# Patient Record
Sex: FEMALE | Race: WHITE | NOT HISPANIC OR LATINO | Employment: UNEMPLOYED | URBAN - METROPOLITAN AREA
[De-identification: names, ages, dates, MRNs, and addresses within clinical notes are randomized per-mention and may not be internally consistent; named-entity substitution may affect disease eponyms.]

---

## 2017-05-17 ENCOUNTER — CONVERSION ENCOUNTER (OUTPATIENT)
Dept: GENERAL RADIOLOGY | Facility: HOSPITAL | Age: 57
End: 2017-05-17

## 2019-05-14 ENCOUNTER — HOSPITAL ENCOUNTER (OUTPATIENT)
Dept: MAMMOGRAPHY | Facility: HOSPITAL | Age: 59
Discharge: HOME | End: 2019-05-14
Attending: OBSTETRICS & GYNECOLOGY

## 2019-05-17 ENCOUNTER — HOSPITAL ENCOUNTER (OUTPATIENT)
Dept: MAMMOGRAPHY | Facility: HOSPITAL | Age: 59
Discharge: HOME | End: 2019-05-17
Attending: OBSTETRICS & GYNECOLOGY

## 2019-05-28 ENCOUNTER — HOSPITAL ENCOUNTER (OUTPATIENT)
Dept: ULTRASOUND IMAGING | Facility: HOSPITAL | Age: 59
Discharge: HOME | End: 2019-05-28
Attending: OBSTETRICS & GYNECOLOGY

## 2019-06-19 ENCOUNTER — CONVERSION ENCOUNTER (OUTPATIENT)
Dept: SURGERY | Facility: CLINIC | Age: 59
End: 2019-06-19

## 2019-06-19 ENCOUNTER — OFFICE VISIT CONVERTED (OUTPATIENT)
Dept: SURGERY | Facility: CLINIC | Age: 59
End: 2019-06-19
Attending: SURGERY

## 2019-07-09 ENCOUNTER — HOSPITAL ENCOUNTER (OUTPATIENT)
Dept: PERIOP | Facility: HOSPITAL | Age: 59
Setting detail: HOSPITAL OUTPATIENT SURGERY
Discharge: HOME | End: 2019-07-09
Attending: SURGERY

## 2019-07-09 LAB
ANION GAP SERPL CALC-SCNC: 13 MMOL/L (ref 8–19)
BUN SERPL-MCNC: 15 MG/DL (ref 5–25)
BUN/CREAT SERPL: 19 {RATIO} (ref 6–20)
CALCIUM SERPL-MCNC: 9 MG/DL (ref 8.7–10.4)
CHLORIDE SERPL-SCNC: 105 MMOL/L (ref 99–111)
CONV CO2: 29 MMOL/L (ref 22–32)
CREAT UR-MCNC: 0.8 MG/DL (ref 0.5–0.9)
GFR SERPLBLD BASED ON 1.73 SQ M-ARVRAT: >60 ML/MIN/{1.73_M2}
GLUCOSE SERPL-MCNC: 97 MG/DL (ref 65–99)
OSMOLALITY SERPL CALC.SUM OF ELEC: 295 MOSM/KG (ref 273–304)
POTASSIUM SERPL-SCNC: 4.5 MMOL/L (ref 3.5–5.3)
SODIUM SERPL-SCNC: 142 MMOL/L (ref 135–147)

## 2019-07-17 ENCOUNTER — OFFICE VISIT CONVERTED (OUTPATIENT)
Dept: SURGERY | Facility: CLINIC | Age: 59
End: 2019-07-17
Attending: SURGERY

## 2019-07-24 ENCOUNTER — OFFICE VISIT CONVERTED (OUTPATIENT)
Dept: SURGERY | Facility: CLINIC | Age: 59
End: 2019-07-24
Attending: SURGERY

## 2019-08-09 ENCOUNTER — HOSPITAL ENCOUNTER (OUTPATIENT)
Dept: PERIOP | Facility: HOSPITAL | Age: 59
Setting detail: HOSPITAL OUTPATIENT SURGERY
Discharge: HOME | End: 2019-08-09
Attending: SURGERY

## 2019-08-09 LAB — TROPONIN T SERPL-MCNC: <0.01 NG/ML (ref 0–0.1)

## 2019-08-15 ENCOUNTER — OFFICE VISIT CONVERTED (OUTPATIENT)
Dept: SURGERY | Facility: CLINIC | Age: 59
End: 2019-08-15
Attending: SURGERY

## 2019-08-22 ENCOUNTER — OFFICE VISIT CONVERTED (OUTPATIENT)
Dept: SURGERY | Facility: CLINIC | Age: 59
End: 2019-08-22
Attending: SURGERY

## 2019-09-03 ENCOUNTER — HOSPITAL ENCOUNTER (OUTPATIENT)
Dept: RADIATION ONCOLOGY | Facility: HOSPITAL | Age: 59
Setting detail: RECURRING SERIES
Discharge: HOME | End: 2019-09-30
Attending: RADIOLOGY

## 2019-09-05 ENCOUNTER — OFFICE VISIT CONVERTED (OUTPATIENT)
Dept: SURGERY | Facility: CLINIC | Age: 59
End: 2019-09-05
Attending: SURGERY

## 2019-10-01 ENCOUNTER — HOSPITAL ENCOUNTER (OUTPATIENT)
Dept: RADIATION ONCOLOGY | Facility: HOSPITAL | Age: 59
Setting detail: RECURRING SERIES
Discharge: HOME | End: 2019-10-31
Attending: RADIOLOGY

## 2019-10-28 ENCOUNTER — CONVERSION ENCOUNTER (OUTPATIENT)
Dept: SURGERY | Facility: CLINIC | Age: 59
End: 2019-10-28

## 2019-10-28 ENCOUNTER — OFFICE VISIT CONVERTED (OUTPATIENT)
Dept: SURGERY | Facility: CLINIC | Age: 59
End: 2019-10-28
Attending: NURSE PRACTITIONER

## 2019-10-30 ENCOUNTER — OFFICE VISIT CONVERTED (OUTPATIENT)
Dept: SURGERY | Facility: CLINIC | Age: 59
End: 2019-10-30
Attending: SURGERY

## 2019-11-01 ENCOUNTER — HOSPITAL ENCOUNTER (OUTPATIENT)
Dept: ULTRASOUND IMAGING | Facility: HOSPITAL | Age: 59
Discharge: HOME | End: 2019-11-01
Attending: SURGERY

## 2019-11-08 ENCOUNTER — HOSPITAL ENCOUNTER (OUTPATIENT)
Dept: RADIATION ONCOLOGY | Facility: HOSPITAL | Age: 59
Discharge: HOME | End: 2019-11-08
Attending: RADIOLOGY

## 2019-11-11 ENCOUNTER — OFFICE VISIT CONVERTED (OUTPATIENT)
Dept: SURGERY | Facility: CLINIC | Age: 59
End: 2019-11-11
Attending: SURGERY

## 2019-11-22 ENCOUNTER — HOSPITAL ENCOUNTER (OUTPATIENT)
Dept: RADIATION ONCOLOGY | Facility: HOSPITAL | Age: 59
Discharge: HOME | End: 2019-11-22
Attending: RADIOLOGY

## 2019-12-06 ENCOUNTER — OFFICE VISIT CONVERTED (OUTPATIENT)
Dept: ONCOLOGY | Facility: HOSPITAL | Age: 59
End: 2019-12-06
Attending: INTERNAL MEDICINE

## 2019-12-06 ENCOUNTER — HOSPITAL ENCOUNTER (OUTPATIENT)
Dept: ONCOLOGY | Facility: HOSPITAL | Age: 59
Discharge: HOME | End: 2019-12-06
Attending: INTERNAL MEDICINE

## 2019-12-23 ENCOUNTER — OFFICE VISIT CONVERTED (OUTPATIENT)
Dept: SURGERY | Facility: CLINIC | Age: 59
End: 2019-12-23
Attending: SURGERY

## 2020-01-27 ENCOUNTER — HOSPITAL ENCOUNTER (OUTPATIENT)
Dept: MAMMOGRAPHY | Facility: HOSPITAL | Age: 60
Discharge: HOME | End: 2020-01-27
Attending: INTERNAL MEDICINE

## 2020-02-11 ENCOUNTER — OFFICE VISIT CONVERTED (OUTPATIENT)
Dept: ONCOLOGY | Facility: HOSPITAL | Age: 60
End: 2020-02-11
Attending: INTERNAL MEDICINE

## 2020-02-11 ENCOUNTER — HOSPITAL ENCOUNTER (OUTPATIENT)
Dept: ONCOLOGY | Facility: HOSPITAL | Age: 60
Discharge: HOME | End: 2020-02-11
Attending: INTERNAL MEDICINE

## 2020-02-28 ENCOUNTER — HOSPITAL ENCOUNTER (OUTPATIENT)
Dept: RADIATION ONCOLOGY | Facility: HOSPITAL | Age: 60
Discharge: HOME | End: 2020-02-28
Attending: RADIOLOGY

## 2020-06-05 ENCOUNTER — HOSPITAL ENCOUNTER (OUTPATIENT)
Dept: RADIATION ONCOLOGY | Facility: HOSPITAL | Age: 60
Discharge: HOME | End: 2020-06-05
Attending: RADIOLOGY

## 2020-06-05 ENCOUNTER — HOSPITAL ENCOUNTER (OUTPATIENT)
Dept: URGENT CARE | Facility: CLINIC | Age: 60
Discharge: HOME | End: 2020-06-05
Attending: NURSE PRACTITIONER

## 2020-06-08 LAB — B BURGDOR IGG+IGM SER-ACNC: <0.91 ISR (ref 0–0.9)

## 2020-06-09 LAB
E CHAFFEENSIS IGG TITR SER IF: NEGATIVE {TITER}
E. CHAFFEENSIS (HME) IGM TITER: NEGATIVE
R RICKETTSI IGG SER QL IA: NEGATIVE
R RICKETTSI IGM TITR SER: 0.4 INDEX (ref 0–0.89)

## 2020-07-07 ENCOUNTER — HOSPITAL ENCOUNTER (OUTPATIENT)
Dept: PHYSICAL THERAPY | Facility: CLINIC | Age: 60
Setting detail: RECURRING SERIES
Discharge: STILL ADMITTED OR ADMINISTRATIVE | End: 2020-10-28
Attending: SURGERY

## 2020-07-23 ENCOUNTER — HOSPITAL ENCOUNTER (OUTPATIENT)
Dept: MAMMOGRAPHY | Facility: HOSPITAL | Age: 60
Discharge: HOME | End: 2020-07-23
Attending: INTERNAL MEDICINE

## 2020-08-14 ENCOUNTER — OFFICE VISIT CONVERTED (OUTPATIENT)
Dept: ONCOLOGY | Facility: HOSPITAL | Age: 60
End: 2020-08-14
Attending: INTERNAL MEDICINE

## 2020-08-14 ENCOUNTER — HOSPITAL ENCOUNTER (OUTPATIENT)
Dept: ONCOLOGY | Facility: HOSPITAL | Age: 60
Discharge: HOME | End: 2020-08-14
Attending: INTERNAL MEDICINE

## 2020-10-30 ENCOUNTER — HOSPITAL ENCOUNTER (OUTPATIENT)
Dept: PHYSICAL THERAPY | Facility: CLINIC | Age: 60
Setting detail: RECURRING SERIES
Discharge: HOME | End: 2021-02-18
Attending: SURGERY

## 2021-05-15 VITALS — RESPIRATION RATE: 14 BRPM | HEIGHT: 63 IN | BODY MASS INDEX: 46.95 KG/M2 | WEIGHT: 265 LBS

## 2021-05-15 VITALS — WEIGHT: 265 LBS | BODY MASS INDEX: 46.95 KG/M2 | RESPIRATION RATE: 14 BRPM | HEIGHT: 63 IN

## 2021-05-15 VITALS — BODY MASS INDEX: 46.95 KG/M2 | RESPIRATION RATE: 14 BRPM | HEIGHT: 63 IN | WEIGHT: 265 LBS

## 2021-05-15 VITALS — BODY MASS INDEX: 46.95 KG/M2 | WEIGHT: 265 LBS | HEIGHT: 63 IN | RESPIRATION RATE: 14 BRPM

## 2021-05-15 VITALS — BODY MASS INDEX: 49.43 KG/M2 | WEIGHT: 279 LBS | HEIGHT: 63 IN | RESPIRATION RATE: 14 BRPM

## 2021-05-15 VITALS — BODY MASS INDEX: 46.95 KG/M2 | WEIGHT: 265 LBS | RESPIRATION RATE: 14 BRPM | HEIGHT: 63 IN

## 2021-05-15 VITALS — WEIGHT: 279 LBS | HEIGHT: 63 IN | BODY MASS INDEX: 49.43 KG/M2 | HEART RATE: 96 BPM

## 2021-05-15 VITALS — WEIGHT: 265 LBS | HEIGHT: 63 IN | BODY MASS INDEX: 46.95 KG/M2 | RESPIRATION RATE: 14 BRPM

## 2021-05-15 VITALS — BODY MASS INDEX: 48.73 KG/M2 | WEIGHT: 275 LBS | HEIGHT: 63 IN | RESPIRATION RATE: 20 BRPM

## 2021-05-15 VITALS — RESPIRATION RATE: 18 BRPM | HEIGHT: 63 IN | WEIGHT: 275 LBS | BODY MASS INDEX: 48.73 KG/M2

## 2021-05-22 ENCOUNTER — TRANSCRIBE ORDERS (OUTPATIENT)
Dept: ADMINISTRATIVE | Facility: HOSPITAL | Age: 61
End: 2021-05-22

## 2021-05-22 DIAGNOSIS — Z12.31 VISIT FOR SCREENING MAMMOGRAM: Primary | ICD-10-CM

## 2021-05-22 DIAGNOSIS — Z12.39 SCREENING BREAST EXAMINATION: ICD-10-CM

## 2021-05-28 VITALS
HEART RATE: 96 BPM | OXYGEN SATURATION: 98 % | RESPIRATION RATE: 20 BRPM | SYSTOLIC BLOOD PRESSURE: 145 MMHG | HEIGHT: 63 IN | WEIGHT: 276.02 LBS | TEMPERATURE: 98 F | BODY MASS INDEX: 47.89 KG/M2 | BODY MASS INDEX: 51.88 KG/M2 | BODY MASS INDEX: 48.89 KG/M2 | WEIGHT: 270.28 LBS | TEMPERATURE: 98.5 F | OXYGEN SATURATION: 98 % | HEART RATE: 99 BPM | SYSTOLIC BLOOD PRESSURE: 120 MMHG | DIASTOLIC BLOOD PRESSURE: 84 MMHG | DIASTOLIC BLOOD PRESSURE: 75 MMHG | WEIGHT: 292.77 LBS | OXYGEN SATURATION: 100 % | TEMPERATURE: 97.8 F | HEIGHT: 63 IN | HEART RATE: 78 BPM | RESPIRATION RATE: 20 BRPM | SYSTOLIC BLOOD PRESSURE: 182 MMHG | DIASTOLIC BLOOD PRESSURE: 85 MMHG

## 2021-05-28 NOTE — PROGRESS NOTES
Patient: ALYSSA PUTNAM     Acct: [id number]     Report: #[id number]  UNIT #: [id number]     : 1960    Encounter Date:2020  PRIMARY CARE: NIELS LATHAM  ***Signed***  --------------------------------------------------------------------------------------------------------------------  NURSE INTAKE      Visit Type      Established Patient Visit            Chief Complaint      BREAST CA POST RADIATION HERE FOR FOLLOW UP            History and Present Illness      Past Oncology Illness History      Ms. Putnam is a 59-year-old female with early stage breast cancer who comes in for    treatment recommendations following lumpectomy and radiation.  She initially     underwent a screening mammogram which led to discovery of this mass on     2019.  There is an area of architectural distortion in the upper outer left    breast.  Diagnostic mammogram and ultrasound performed on 2019 revealed a     spiculated abnormality in the upper outer left breast corresponding to a     geographic area of abnormal tissue density which appears to be spiculated and     ultrasound at the 1:30 position 8 cm from the nipple measuring 8 x 6 mm.      Pathology from the biopsy performed on 2019 revealed a radial scar negative    for atypia and malignancy.  Left breast lumpectomy was performed on 2019     revealing pure tubular carcinoma, 9 mm in greatest dimension, grade 1 with     associated DCIS and LCIS, ER positive (95%, strong), progesterone receptor     positive (90%, moderate), Ki-67 approximately 10%, HER-2 negative by     immunohistochemistry (score 1+). [facility] lymph node biopsy was performed on     2019.  2 lymph nodes were removed and pathology was negative on both.  Ms. LAURY christie completed radiation in late 2019.  Patient was referred to medical     oncology after completion of radiation therapy. Patient started on anastrozole     in 2019.            HPI - Oncology Interim       Patient came in today to follow-up after starting anastrozole.  She is     tolerating the medication well and has no side effects.  Her latest mammogram     was on 7/23/2020 and was negative.            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            ECOG Performance Status      0            PAST, FAMILY   Past Medical History      Past Medical History:  Arthritis, High Cholesterol, Hypertension      Hematology/Oncology (F):  Breast Cancer            Past Surgical History      Biopsy, Cholecystectomy            DNC            Family History      Family History:  Leukopenia (FATHER), Lung Cancer (MOTHER)            Social History      Marital Status:        Lives independently:  Yes      Number of Children:  3            Tobacco Use      Tobacco status:  Former smoker            Alcohol Use      Alcohol intake:  None            Substance Use      Substance use:  Denies use            REVIEW OF SYSTEMS      General:  Denies: Appetite Change, Fatigue, Fever, Night Sweats, Weight Gain,     Weight Loss      Eye:  Denies Blurred Vision, Denies Corrective Lenses, Denies Diplopia, Denies     Vision Changes      ENT:  Denies Headache, Denies Hearing Loss, Denies Hoarseness, Denies Sore     Throat      Cardiovascular:  Denies Chest Pain, Denies Palpitations      Respiratory:  Denies: Cough, Coughing Blood, Productive Cough, Shortness of Air,    Wheezing      Gastrointestinal:  Denies Bloody Stools, Denies Constipation, Denies Diarrhea,     Denies Nausea/Vomiting, Denies Problem Swallowing, Denies Unable to Control Pablo    wels      Genitourinary:  Denies Blood in Urine, Denies Incontinence, Denies Painful     Urination      Musculoskeletal:  Admits Back Pain; Denies Muscle Pain, Denies Painful Joints      Integumentary:  Denies Itching, Denies Lesions, Denies Rash      Neurologic:  Denies Dizziness; Admits Numbness\Tingling; Denies Seizures      Psychiatric:  Denies Anxiety, Denies  Depression      Endocrine:  Denies Cold Intolerance, Denies Heat Intolerance      Hematologic/Lymphatic:  Denies Bruising, Denies Bleeding, Denies Enlarged Lymph     Nodes      Reproductive:  Denies: Menopause, Heavy Periods, Pregnant, Still Menstruating            VITAL SIGNS AND SCORES      Vitals      Height 5 ft 3.00 in / 160.02 cm      Weight 292 lbs 12.334 oz / 132.8 kg      BSA 2.27 m2      BMI 51.9 kg/m2      Temperature 97.8 F / 36.56 C - Temporal      Pulse 78      Respirations 20      Blood Pressure 120/84 Sitting, Right Arm      Pulse Oximetry 100%, ROOM AIR            Pain Score      Experiencing any pain?:  No      Pain Scale Used:  Numerical      Pain Intensity:  0            Fatigue Score      Experiencing any fatigue?:  No      Fatigue (0-10 scale):  0 (none)            EXAM      General: Alert, cooperative, no acute distress      Eyes: Anicteric sclera, PERRLA      HEENT: Oropharynx clear, no exudates      Respiratory: CTAB, normal respiratory effort      Abdomen: Normal active bowel sounds, no tenderness, no distention      Cardiovascular: RRR, no murmur, no peripheral edema      Skin: Normal tone, no rash, no lesions      Psychiatric: Appropriate affect, intact judgment      Neurologic: No focal sensory or motor deficits, no weakness, numbness, dizziness      Musculoskeletal: Normal muscle strength, normal muscle tone      Extremities: No clubbing, cyanosis, or deformities            PREVENTION      Hx Influenza Vaccination:  Yes      Date Influenza Vaccine Given:  Dec 1, 2019      Influenza Vaccine Declined:  No      2 or More Falls in Past Year?:  No      Fall Past Year with Injury?:  No      Hx Pneumococcal Vaccination:  Yes      Encouraged to follow-up with:  PCP regarding preventative exams.      Chart initiated by      ASHU GODWIN            ALLERGY/MEDS      Allergies      Coded Allergies:             MUSHROOM (Verified  Allergy, Unknown, BREAK OUT, ITCH, 6/5/20)           NO KNOWN  DRUG ALLERGIES (Verified  Allergy, Unknown, 6/5/20)            Medications      Last Reconciled on 8/20/20 20:19 by CURTIS MOROCHO      Anastrozole (Anastrozole) 1 Mg Tablet      1 MG PO QDAY for 30 Days, #30 TAB 2 Refills         Prov: CURTIS MOROCHO         6/25/20       Doxycycline Hyclate (Doxycycline Hyclate*) 100 Mg Capsule      100 MG PO BID for 10 Days, #20 CAP 0 Refills         Prov: TUNG ROSENTHAL cfe         6/5/20       Doxycycline Hyclate (Doxycycline Hyclate*) 100 Mg Capsule      100 MG PO BID for 10 Days, #20 CAP 0 Refills         Prov: CURTIS MOROCHO         2/11/20       Gabapentin (Gabapentin) 100 Mg Capsule      100 MG PO TID for 10 Days, #30 CAP 0 Refills         Prov: BECCAMARIALUISA         8/9/19       Lisinopril* (Lisinopril*) 20 Mg Tablet      20 MG PO HS, #30 TAB 0 Refills         Reported         7/3/19       Cholecalciferol (Vitamin D3) 50,000 Unit Capsule      67968 UNITS PO Q7D, CAP         Reported         7/3/19       (Turmeric/Black Pep.)   No Conflict Check      1 TAB PO QDAY         Reported         7/3/19       Cholecalciferol (Vitamin D3) (Vitamin D3) 5,000 Unit Capsule      5000 UNITS PO QDAY for 30 Days, #30 CAP         Reported         7/3/19       Biotin (Biotin) 5 Mg Tab      5 MG PO QDAY, #30 TAB         Reported         7/3/19       (Cbd Oil)   No Conflict Check      1 TAB PO BID         Reported         7/3/19       hydrOXYzine HCL (Atarax) 25 Mg Tablet      25 MG PO HS, #120 TAB 0 Refills         Reported         7/3/19       (Metabaloplus)   No Conflict Check      1 TAB PO QDAY         Reported         7/3/19       (Lipozene)   No Conflict Check      1500 MG PO TID         Reported         7/3/19       Furosemide* (Lasix*) 20 Mg Tablet      20 MG PO QDAY PRN for EDEMA, #30 TAB 0 Refills         Reported         7/3/19       Butalb/Aspirin/Caff/Codeine 50/325/40/30 MG (Fiorinal w/Codeine) 1 Cap Capsule      1 CAP PO Q4H PRN for HEADACHE, CAP         Reported         7/3/19        Gabapentin (Gabapentin) 800 Mg Tablet      800 MG PO TID, #90 TAB 0 Refills         Reported         7/3/19       (Amitriptyline)   No Conflict Check      150 MG PO HS         Reported         7/3/19       Pravastatin Sodium (Pravastatin Sodium) 40 Mg Tablet      40 MG PO HS, #30 TAB 0 Refills         Reported         7/3/19       Nabumetone (Nabumetone) 750 Mg Tab      750 MG PO BID, #60 TAB         Reported         7/3/19      Medications Reviewed:  No Changes made to meds            IMPRESSION/PLAN      Impression      59-year-old female with a history of 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  She is currently on anastrozole.            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  She completed     radiation in November 2019 and started anastrozole in December.  She is     tolerating AI well and will continue it until Dec 2024.  She will be due for     repeat mammogram in July 2021 which is ordered by her PCP.            DCIS and LCIS: Patient incidentally found to have both premalignant findings on     lumpectomy.  She will continue AI as above.             Osteopenia: DEXA scan shows osteopenia in the hips in January 2020.  She was     encouraged to continue calcium and vitamin D supplementation.  She was also     encouraged to dissipate in weightbearing exercise.  Her next DEXA scan should be     done in January 2021.            Left breast lymphedema: Patient is currently seeing PT for evaluation and     treatment.            Patient Education      Patient Education Provided:  Yes            Electronically signed by CURTIS MOROCHO  08/20/2020 20:20        Disclaimer: Converted document may not contain table formatting or lab diagrams. Please see [employer] for the authenticated document.

## 2021-05-28 NOTE — PROGRESS NOTES
Patient: ALYSSA PUTNAM     Acct: [id number]     Report: #[id number]  UNIT #: [id number]     : 1960    Encounter Date:2019  PRIMARY CARE: NIELS LATHAM  ***Signed***  --------------------------------------------------------------------------------------------------------------------  NURSE INTAKE      Visit Type      New Patient Visit            Chief Complaint      BREAST CA/ POST RADATION            History and Present Illness      Past Oncology Illness History      Ms. Putnam is a 59-year-old female with early stage breast cancer who comes in for    treatment recommendations following lumpectomy and radiation.  She initially     underwent a screening mammogram which led to discovery of this mass on     2019.  There is an area of architectural distortion in the upper outer left    breast.  Diagnostic mammogram and ultrasound performed on 2019 revealed a     spiculated abnormality in the upper outer left breast corresponding to a     geographic area of abnormal tissue density which appears to be spiculated and     ultrasound at the 1:30 position 8 cm from the nipple measuring 8 x 6 mm.      Pathology from the biopsy performed on 2019 revealed a radial scar negative    for atypia and malignancy.  Left breast lumpectomy was performed on 2019     revealing pure tubular carcinoma, 9 mm in greatest dimension, grade 1 with     associated DCIS and LCIS, ER positive (95%, strong), progesterone receptor     positive (90%, moderate), Ki-67 approximately 10%, HER-2 negative by     immunohistochemistry (score 1+). [facility] lymph node biopsy was performed on     2019.  2 lymph nodes were removed and pathology was negative on both.  Ms. Putnam completed radiation in late 2019.  Patient was referred to medical    oncology after completion of radiation therapy.            HPI - Oncology Interim      Patient comes in today for evaluation regarding long-term treatment of her  stage    I tubular adenocarcinoma with associated DCIS and LCIS.  She feels well with the    exception of skin irritation and thickening following radiation therapy.  She     questions why she needed to come see medical oncologist since she thought she     was completely finished with treatment.  We spent a long time discussing the     reasons for an AI and the risks and benefits.  During the discussion the patient    mentioned that she has a history of a vulvar skin condition which caused white     patches and took some time to diagnose.  She was eventually treated with     complete resolution but the symptoms have recurred.  She has not yet sought     treatment again with her gynecologist.            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            ECOG Performance Status      0            PAST, FAMILY   Past Medical History      Past Medical History:  Arthritis, High Cholesterol, Hypertension      Hematology/Oncology (F):  Breast Cancer            Past Surgical History      Biopsy, Cholecystectomy            DNC            Family History      Family History:  Leukopenia (FATHER), Lung Cancer (MOTHER)            Social History      Marital Status:        Lives independently:  Yes      Number of Children:  3            Tobacco Use      Tobacco status:  Former smoker            Alcohol Use      Alcohol intake:  None            Substance Use      Substance use:  Denies use            REVIEW OF SYSTEMS      General:  Admits: Fatigue;          Denies: Appetite Change, Fever, Night Sweats, Weight Gain, Weight Loss      Eye:  Denies Blurred Vision, Denies Corrective Lenses, Denies Diplopia, Denies     Vision Changes      ENT:  Denies Headache, Denies Hearing Loss, Denies Hoarseness, Denies Sore     Throat      Cardiovascular:  Admits Chest Pain; Denies Palpitations      Respiratory:  Denies: Coughing Blood, Productive Cough, Shortness of Air,     Wheezing      Gastrointestinal:   Denies Bloody Stools, Denies Constipation, Denies Diarrhea,     Denies Nausea/Vomiting, Denies Problem Swallowing, Denies Unable to Control     Bowels      Genitourinary:  Denies Blood in Urine, Denies Incontinence, Denies Painful     Urination      Musculoskeletal:  Denies Back Pain, Denies Muscle Pain, Denies Painful Joints      Integumentary:  Denies Itching, Denies Lesions, Denies Rash      Neurologic:  Denies Dizziness, Denies Numbness\Tingling, Denies Seizures      Psychiatric:  Denies Anxiety, Denies Depression      Endocrine:  Denies Cold Intolerance, Denies Heat Intolerance      Hematologic/Lymphatic:  Denies Bruising, Denies Bleeding, Denies Enlarged Lymph     Nodes      Reproductive:  Denies: Menopause, Heavy Periods, Pregnant, Still Menstruating            VITAL SIGNS AND SCORES      Vitals      Height 5 ft 3 in / 160.02 cm      Weight 270 lbs 4.543 oz / 122.6 kg      BSA 2.20 m2      BMI 47.9 kg/m2      Temperature 98.5 F / 36.94 C - Temporal      Pulse 99      Blood Pressure 145/75 Sitting, Right Arm      Pulse Oximetry 98%, RM AIR            Pain Score      Experiencing any pain?:  Yes      Pain Scale Used:  Numerical      Pain Intensity:  8            Fatigue Score      Experiencing any fatigue?:  Yes      Fatigue (0-10 scale):  3            EXAM      General: Alert, cooperative, no acute distress      Eyes: Anicteric sclera, PERRLA      HEENT: Oropharynx clear, no exudates      Respiratory: CTAB, normal respiratory effort      Abdomen: Normal active bowel sounds, no tenderness, no distention      Cardiovascular: RRR, no murmur, no peripheral edema      Skin: Skin across the left breast is erythematous with some thickening     consistent with radiation side effects      Psychiatric: Appropriate affect, intact judgment      Neurologic: No focal sensory or motor deficits, no weakness, numbness, dizziness      Musculoskeletal: Normal muscle strength, normal muscle tone      Extremities: No clubbing,  cyanosis, or deformities            PREVENTION      Hx Influenza Vaccination:  Yes      Date Influenza Vaccine Given:  Dec 1, 2019      Influenza Vaccine Declined:  No      2 or More Falls Past Year?:  No      Fall Past Year with Injury?:  No      Hx Pneumococcal Vaccination:  Yes      Encouraged to follow-up with:  PCP regarding preventative exams.      Chart initiated by      GARY PETTIT CMA            ALLERGY/MEDS      Allergies      Coded Allergies:             MUSHROOM (Verified  Allergy, Unknown, BREAK OUT, ITCH, 12/6/19)           NO KNOWN DRUG ALLERGIES (Verified  Allergy, Unknown, 12/6/19)            Medications      Last Reconciled on 12/8/19 22:19 by CURTIS MOROCHO      Anastrozole (Anastrozole) 1 Mg Tablet      1 MG PO QDAY for 30 Days, #30 TAB 2 Refills         Prov: CURTIS MOROCHO         12/6/19       Gabapentin (Gabapentin) 100 Mg Capsule      100 MG PO TID for 10 Days, #30 CAP 0 Refills         Prov: MARIALUISA HUDSON         8/9/19       Hydrocodone/Acetaminophen 5/325 MG (Hydrocodone/Acetaminophen 5/325 MG) 1 Each     Tablet      2 TAB PO Q4H PRN for PAIN, #30 TAB         Prov: MARCIAL ERICKSON MD         8/9/19       Lisinopril* (Lisinopril*) 20 Mg Tablet      20 MG PO HS, #30 TAB 0 Refills         Reported         7/3/19       Cholecalciferol (Vitamin D3) 50,000 Unit Capsule      70959 UNITS PO Q7D, CAP         Reported         7/3/19       (Turmeric/Black Pep.)   No Conflict Check      1 TAB PO QDAY         Reported         7/3/19       Cholecalciferol (Vitamin D3) 5,000 Unit Capsule      5000 UNITS PO QDAY for 30 Days, #30 CAP         Reported         7/3/19       Biotin (Biotin) 5 Mg Tab      5 MG PO QDAY, #30 TAB         Reported         7/3/19       (Cbd Oil)   No Conflict Check      1 TAB PO BID         Reported         7/3/19       hydrOXYzine HCL (Atarax) 25 Mg Tablet      25 MG PO HS, #120 TAB 0 Refills         Reported         7/3/19       (Metabaloplus)   No Conflict Check      1 TAB  PO QDAY         Reported         7/3/19       (Lipozene)   No Conflict Check      1500 MG PO TID         Reported         7/3/19       Hydrochlorothiazide (Hydrochlorothiazide*) 12.5 Mg Capsule      12.5 MG PO QDAY, #30 CAP 0 Refills         Reported         7/3/19       Furosemide* (Lasix*) 20 Mg Tablet      20 MG PO QDAY PRN for EDEMA, #30 TAB 0 Refills         Reported         7/3/19       Butalb/Aspirin/Caff/Codeine 50/325/40/30 MG (Fiorinal w/Codeine) 1 Cap Capsule      1 CAP PO Q4H PRN for HEADACHE, CAP         Reported         7/3/19       Gabapentin (Gabapentin) 800 Mg Tablet      800 MG PO TID, #90 TAB 0 Refills         Reported         7/3/19       (Amitriptyline)   No Conflict Check      150 MG PO HS         Reported         7/3/19       Pravastatin Sodium (Pravastatin Sodium) 40 Mg Tablet      40 MG PO HS, #30 TAB 0 Refills         Reported         7/3/19       Nabumetone (Nabumetone) 750 Mg Tab      750 MG PO BID, #60 TAB         Reported         7/3/19      Medications Reviewed:  No Changes made to meds            IMPRESSION/PLAN      Impression      59-year-old female with stage I tubular adenocarcinoma with associated DCIS and     LCIS of the left breast status post lumpectomy and radiation.            Diagnosis      Breast cancer - C50.919            High risk medication use - Z79.899            Notes      New Medications      * Anastrozole 1 MG TABLET: 1 MG PO QDAY 30 Days #30      New Diagnostics      * Bone Densitometry DEXA, As Soon As Possible         Dx: Breast cancer - C50.919            Plan      Stage I tubular adenocarcinoma of the left breast: The patient received     lumpectomy and sentinel lymph node biopsy followed by radiation therapy.  For     the small tubular adenocarcinomas there is no recommendation for chemotherapy     and endocrine therapy is optional for any mass less than 3 cm.  However I     discussed long-term treatment with an AI due to the presence of DCIS and LCIS as      well.            DCIS and LCIS: The patient was incidentally found to have both premalignant     findings on lumpectomy.  For this reason she was encouraged to start an     aromatase inhibitor for at least 5 years for breast cancer risk reduction.  We     reviewed the risk and benefit of anastrozole and the patient was agreeable.  She     will be scheduled for DEXA scan and can start anastrozole whenever she picks it     up from the pharmacy.  She will follow-up with me in 6 to 8 weeks for toxicity     check.            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      She remembers using some topical medication and may be an oral medication as     well.  She experienced significant improvement but is now started at the same     symptoms.  I discussed that the patient should return to her gynecologist for     further evaluation and discuss this in the context of starting anastrozole as     well.  She may require more close monitoring and treatment.            Patient Education      Patient Education Provided:  Yes            Time Spent Counseling Patient      Total Visit Time:  75      Over 50% Time Counseling Pt:  Yes            Electronically signed by CURTIS MOROCHO  12/08/2019 22:19       Disclaimer: Converted document may not contain table formatting or lab diagrams. Please see [employer] for the authenticated document.

## 2021-05-28 NOTE — PROGRESS NOTES
Patient: ALYSSA PUTNAM     Acct: [id number]     Report: #[id number]  UNIT #: [id number]     : 1960    Encounter Date:2020  PRIMARY CARE: NIELS LATHAM  ***Signed***  --------------------------------------------------------------------------------------------------------------------  NURSE INTAKE      Visit Type      Established Patient Visit            Chief Complaint      BREAST CANCER/POST RADIATION            History and Present Illness      Past Oncology Illness History      Ms. Putnam is a 59-year-old female with early stage breast cancer who comes in for    treatment recommendations following lumpectomy and radiation.  She initially     underwent a screening mammogram which led to discovery of this mass on     2019.  There is an area of architectural distortion in the upper outer left    breast.  Diagnostic mammogram and ultrasound performed on 2019 revealed a     spiculated abnormality in the upper outer left breast corresponding to a     geographic area of abnormal tissue density which appears to be spiculated and     ultrasound at the 1:30 position 8 cm from the nipple measuring 8 x 6 mm.      Pathology from the biopsy performed on 2019 revealed a radial scar negative    for atypia and malignancy.  Left breast lumpectomy was performed on 2019     revealing pure tubular carcinoma, 9 mm in greatest dimension, grade 1 with     associated DCIS and LCIS, ER positive (95%, strong), progesterone receptor     positive (90%, moderate), Ki-67 approximately 10%, HER-2 negative by     immunohistochemistry (score 1+). [place] lymph node biopsy was performed on     2019.  2 lymph nodes were removed and pathology was negative on both.  Ms. Putnam completed radiation in late 2019.  Patient was referred to medical    oncology after completion of radiation therapy. Patient started on anastrozole     in 2019.            HPI - Oncology Interim      Patient comes  in today for follow-up after starting anastrozole.  She is     tolerating it very well.  She has chronic joint pains from osteoarthritis but     nothing new.  She denies significant fatigue or hot flashes.  Her only complaint    is soreness of her left breast.  She states that her left breast has been sore     and swollen since that time she completed radiation.  At times it is hot to     touch.  She recently fell walking down stairs and landed on that side of her     body.  This episode caused more pain.  She is unable to sleep on her left side     due to the persistent pain.  She is also unable to wear a bra most days.            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            ECOG Performance Status      0            PAST, FAMILY   Past Medical History      Past Medical History:  Arthritis, High Cholesterol, Hypertension      Hematology/Oncology (F):  Breast Cancer            Past Surgical History      Biopsy, Cholecystectomy            DNC            Family History      Family History:  Leukopenia (FATHER), Lung Cancer (MOTHER)            Social History      Marital Status:        Lives independently:  Yes      Number of Children:  3            Tobacco Use      Tobacco status:  Former smoker            Alcohol Use      Alcohol intake:  None            Substance Use      Substance use:  Denies use            REVIEW OF SYSTEMS      General:  Denies: Appetite Change, Fatigue, Fever, Night Sweats, Weight Gain,     Weight Loss      Eye:  Denies Blurred Vision, Denies Corrective Lenses, Denies Diplopia, Denies     Vision Changes      ENT:  Denies Headache, Denies Hearing Loss, Denies Hoarseness, Denies Sore     Throat      Cardiovascular:  Denies Chest Pain, Denies Palpitations      Respiratory:  Denies: Coughing Blood, Productive Cough, Shortness of Air,     Wheezing      Gastrointestinal:  Denies Bloody Stools, Denies Constipation, Denies Diarrhea,     Denies Nausea/Vomiting,  Denies Problem Swallowing, Denies Unable to Control     Bowels      Genitourinary:  Denies Blood in Urine, Denies Incontinence, Denies Painful Uri    nation      Musculoskeletal:  Denies Back Pain, Denies Muscle Pain, Denies Painful Joints      Integumentary:  Denies Itching, Denies Lesions, Denies Rash      Neurologic:  Denies Dizziness, Denies Numbness\Tingling, Denies Seizures      Psychiatric:  Denies Anxiety, Denies Depression      Endocrine:  Denies Cold Intolerance, Denies Heat Intolerance      Hematologic/Lymphatic:  Denies Bruising, Denies Bleeding, Denies Enlarged Lymph     Nodes      Reproductive:  Denies: Menopause, Heavy Periods, Pregnant, Still Menstruating            VITAL SIGNS AND SCORES      Vitals      Weight 276 lbs 0.255 oz / 125.2 kg      Temperature 98.0 F / 36.67 C - Temporal      Pulse 96      Respirations 20      Blood Pressure 182/85 Sitting, Right Arm      Pulse Oximetry 98%, ROOM AIR            Pain Score      Experiencing any pain?:  Yes      Pain Scale Used:  Numerical      Pain Intensity:  8            Fatigue Score      Experiencing any fatigue?:  No      Fatigue (0-10 scale):  0 (none)            EXAM      General: Alert, cooperative, no acute distress      Eyes: Anicteric sclera, PERRLA      HEENT: Oropharynx clear, no exudates      Breast: Left breast is diffusely erythematous and mildly warm.  There is pitting    edema across the entire breast.  Skin changes are not limited to the area of     focused radiation.      Respiratory: CTAB, normal respiratory effort      Abdomen: Normal active bowel sounds, no tenderness, no distention      Cardiovascular: RRR, no murmur, no peripheral edema      Skin: Normal tone, no rash, no lesions      Psychiatric: Appropriate affect, intact judgment      Neurologic: No focal sensory or motor deficits, no weakness, numbness, dizziness      Musculoskeletal: Normal muscle strength, normal muscle tone      Extremities: No clubbing, cyanosis, or  deformities            PREVENTION      Hx Influenza Vaccination:  Yes      Date Influenza Vaccine Given:  Dec 1, 2019      Influenza Vaccine Declined:  No      2 or More Falls Past Year?:  No      Fall Past Year with Injury?:  No      Hx Pneumococcal Vaccination:  Yes      Encouraged to follow-up with:  PCP regarding preventative exams.      Chart initiated by      PAWEL MEDRANO CMA            ALLERGY/MEDS      Allergies      Coded Allergies:             MUSHROOM (Verified  Allergy, Unknown, BREAK OUT, ITCH, 2/11/20)           NO KNOWN DRUG ALLERGIES (Verified  Allergy, Unknown, 2/11/20)            Medications      Last Reconciled on 2/11/20 16:24 by CURTIS MOROCHO      Doxycycline Hyclate (Doxycycline Hyclate*) 100 Mg Capsule      100 MG PO BID for 10 Days, #20 CAP 0 Refills         Prov: CURTIS MOROCHO         2/11/20       Anastrozole (Anastrozole) 1 Mg Tablet      1 MG PO QDAY for 30 Days, #30 TAB 2 Refills         Prov: CURTIS MOROCHO         12/6/19       Gabapentin (Gabapentin) 100 Mg Capsule      100 MG PO TID for 10 Days, #30 CAP 0 Refills         Prov: MARIALUISA HUDSON         8/9/19       Lisinopril* (Lisinopril*) 20 Mg Tablet      20 MG PO HS, #30 TAB 0 Refills         Reported         7/3/19       Cholecalciferol (Vitamin D3) 50,000 Unit Capsule      06949 UNITS PO Q7D, CAP         Reported         7/3/19       (Turmeric/Black Pep.)   No Conflict Check      1 TAB PO QDAY         Reported         7/3/19       Cholecalciferol (Vitamin D3) 5,000 Unit Capsule      5000 UNITS PO QDAY for 30 Days, #30 CAP         Reported         7/3/19       Biotin (Biotin) 5 Mg Tab      5 MG PO QDAY, #30 TAB         Reported         7/3/19       (Cbd Oil)   No Conflict Check      1 TAB PO BID         Reported         7/3/19       hydrOXYzine HCL (Atarax) 25 Mg Tablet      25 MG PO HS, #120 TAB 0 Refills         Reported         7/3/19       (Metabaloplus)   No Conflict Check      1 TAB PO QDAY         Reported         7/3/19        (Lipozene)   No Conflict Check      1500 MG PO TID         Reported         7/3/19       Furosemide* (Lasix*) 20 Mg Tablet      20 MG PO QDAY PRN for EDEMA, #30 TAB 0 Refills         Reported         7/3/19       Butalb/Aspirin/Caff/Codeine 50/325/40/30 MG (Fiorinal w/Codeine) 1 Cap Capsule      1 CAP PO Q4H PRN for HEADACHE, CAP         Reported         7/3/19       Gabapentin (Gabapentin) 800 Mg Tablet      800 MG PO TID, #90 TAB 0 Refills         Reported         7/3/19       (Amitriptyline)   No Conflict Check      150 MG PO HS         Reported         7/3/19       Pravastatin Sodium (Pravastatin Sodium) 40 Mg Tablet      40 MG PO HS, #30 TAB 0 Refills         Reported         7/3/19       Nabumetone (Nabumetone) 750 Mg Tab      750 MG PO BID, #60 TAB         Reported         7/3/19      Medications Reviewed:  Changes made to meds            IMPRESSION/PLAN      Impression      59-year-old female with a history of tubular carcinoma of left breast as well as    LCIS and DCIS.            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  She completed     radiation in November 2019 and was subsequently started on anastrozole.  She is     tolerating AI well.  She will be due for repeat mammogram prior to her next     appointment in August.            DCIS and LCIS: Patient incidentally found to have both premalignant findings on     lumpectomy.  For this reason strongly encouraged her to be on an AI for at  least    5 years for breast cancer risk reduction.  She is tolerating anastrozole well,     which she started in December 2018.            Osteopenia: DEXA scan shows osteopenia in the hips.  She was encouraged to     continue calcium and vitamin D supplementation.  She was also encouraged to     dissipate in weightbearing exercise.  Her next DEXA scan will be 2022.            Left breast pain and edema: The patient's breast exam is not consistent with     typical radiation changes.  My concern is that she may have cellulitis versus     mastitis.  I am prescribing doxycycline 100 mg twice daily for 10 days.  If the     skin complaints do not resolve the patient will call the clinic and return to     see me sooner.  Otherwise, if the breast pain and swelling resolves then she     will keep her follow-up in 6 months.            Patient Education      Patient Education Provided:  Yes            Electronically signed by CURTIS MOROCHO  02/11/2020 16:24       Disclaimer: Converted document may not contain table formatting or lab diagrams. Please see [employer] for the authenticated document.

## 2021-07-29 ENCOUNTER — TELEPHONE (OUTPATIENT)
Dept: ONCOLOGY | Facility: HOSPITAL | Age: 61
End: 2021-07-29

## 2021-07-29 NOTE — TELEPHONE ENCOUNTER
Caller: ABDIAS    Relationship: [employer] [place] [employer]    Best call back number: [phone]    What orders are you requesting (i.e. lab or imaging): MAMMOGRAM (AND ANYTHING ELSE THAT NEEDS TO BE DONE)    In what timeframe would the patient need to come in: PATIENT IS SCHEDULED FOR 08/04    Where will you receive your lab/imaging services: [employer] [place] [facility]    Additional notes: FAX: [phone]

## 2021-08-02 ENCOUNTER — APPOINTMENT (OUTPATIENT)
Dept: MAMMOGRAPHY | Facility: HOSPITAL | Age: 61
End: 2021-08-02

## 2021-08-04 PROBLEM — C50.412 MALIGNANT NEOPLASM OF UPPER-OUTER QUADRANT OF LEFT BREAST IN FEMALE, ESTROGEN RECEPTOR POSITIVE: Status: ACTIVE | Noted: 2019-08-28

## 2021-08-04 PROBLEM — Z17.0 MALIGNANT NEOPLASM OF UPPER-OUTER QUADRANT OF LEFT BREAST IN FEMALE, ESTROGEN RECEPTOR POSITIVE: Status: ACTIVE | Noted: 2019-08-28

## 2021-08-06 PROBLEM — F39 MOOD DISORDER (HCC): Status: ACTIVE | Noted: 2021-08-06

## 2021-08-06 PROBLEM — E78.5 HYPERLIPEMIA: Status: ACTIVE | Noted: 2021-08-06

## 2021-08-06 PROBLEM — N32.9 BLADDER DISORDER: Status: ACTIVE | Noted: 2021-08-06

## 2021-08-06 PROBLEM — R07.9 CHEST PAIN: Status: ACTIVE | Noted: 2021-08-06

## 2021-08-06 PROBLEM — I10 HYPERTENSION: Status: ACTIVE | Noted: 2021-08-06

## 2021-08-06 PROBLEM — M19.90 ARTHRITIS: Status: ACTIVE | Noted: 2021-08-06

## 2021-08-06 RX ORDER — LISINOPRIL 20 MG/1
TABLET ORAL
COMMUNITY

## 2021-08-06 RX ORDER — GABAPENTIN 800 MG/1
TABLET ORAL
COMMUNITY

## 2021-08-06 RX ORDER — HYDROXYZINE 50 MG/1
TABLET, FILM COATED ORAL
COMMUNITY

## 2021-08-06 RX ORDER — AMITRIPTYLINE HYDROCHLORIDE 10 MG/1
TABLET, FILM COATED ORAL
COMMUNITY

## 2021-08-09 ENCOUNTER — APPOINTMENT (OUTPATIENT)
Dept: ONCOLOGY | Facility: HOSPITAL | Age: 61
End: 2021-08-09

## 2021-09-27 ENCOUNTER — TELEPHONE (OUTPATIENT)
Dept: OBSTETRICS AND GYNECOLOGY | Facility: CLINIC | Age: 61
End: 2021-09-27

## 2021-09-27 RX ORDER — METRONIDAZOLE 500 MG/1
500 TABLET ORAL 3 TIMES DAILY
Qty: 21 TABLET | Refills: 0 | OUTPATIENT
Start: 2021-09-27 | End: 2021-10-04

## 2021-12-21 ENCOUNTER — TELEPHONE (OUTPATIENT)
Dept: ONCOLOGY | Facility: HOSPITAL | Age: 61
End: 2021-12-21

## 2021-12-21 NOTE — TELEPHONE ENCOUNTER
Caller: Lily Putnam    Relationship to patient: Self    Best call back number:[phone]    Chief complaint: ELIZABETH. APPT.    Type of visit: FOLLOW UP     If rescheduling, when is the original appointment: 8/9/2021    Additional notes: PATIENT INQUIRING WHEN SHE NEEDS HER NEXT APPT.  NEEDS TO BE.  PATIENT HAS HER MAMMOGRAM ON 8/5/022 & HER BONE DENSITY WAS DONE 11/22/2021.

## 2022-01-31 RX ORDER — ANASTROZOLE 1 MG/1
TABLET ORAL
Qty: 90 TABLET | Refills: 1 | Status: SHIPPED | OUTPATIENT
Start: 2022-01-31 | End: 2022-07-11

## 2022-07-09 DIAGNOSIS — Z12.31 ENCOUNTER FOR SCREENING MAMMOGRAM FOR MALIGNANT NEOPLASM OF BREAST: Primary | ICD-10-CM

## 2022-07-11 RX ORDER — ANASTROZOLE 1 MG/1
TABLET ORAL
Qty: 90 TABLET | Refills: 1 | Status: SHIPPED | OUTPATIENT
Start: 2022-07-11 | End: 2022-07-26 | Stop reason: SDUPTHER

## 2022-07-26 RX ORDER — ANASTROZOLE 1 MG/1
1 TABLET ORAL DAILY
Qty: 90 TABLET | Refills: 1 | Status: SHIPPED | OUTPATIENT
Start: 2022-07-26 | End: 2022-08-23

## 2022-08-12 ENCOUNTER — APPOINTMENT (OUTPATIENT)
Dept: ONCOLOGY | Facility: HOSPITAL | Age: 62
End: 2022-08-12

## 2022-08-16 ENCOUNTER — APPOINTMENT (OUTPATIENT)
Dept: ONCOLOGY | Facility: HOSPITAL | Age: 62
End: 2022-08-16

## 2022-08-23 ENCOUNTER — OFFICE VISIT (OUTPATIENT)
Dept: ONCOLOGY | Facility: HOSPITAL | Age: 62
End: 2022-08-23

## 2022-08-23 VITALS
WEIGHT: 265 LBS | RESPIRATION RATE: 18 BRPM | BODY MASS INDEX: 46.94 KG/M2 | DIASTOLIC BLOOD PRESSURE: 70 MMHG | HEART RATE: 76 BPM | TEMPERATURE: 98.9 F | OXYGEN SATURATION: 96 % | SYSTOLIC BLOOD PRESSURE: 136 MMHG

## 2022-08-23 DIAGNOSIS — C50.412 MALIGNANT NEOPLASM OF UPPER-OUTER QUADRANT OF LEFT BREAST IN FEMALE, ESTROGEN RECEPTOR POSITIVE: Primary | ICD-10-CM

## 2022-08-23 DIAGNOSIS — Z17.0 MALIGNANT NEOPLASM OF UPPER-OUTER QUADRANT OF LEFT BREAST IN FEMALE, ESTROGEN RECEPTOR POSITIVE: Primary | ICD-10-CM

## 2022-08-23 PROCEDURE — 99214 OFFICE O/P EST MOD 30 MIN: CPT | Performed by: NURSE PRACTITIONER

## 2022-08-23 PROCEDURE — G0463 HOSPITAL OUTPT CLINIC VISIT: HCPCS | Performed by: NURSE PRACTITIONER

## 2022-08-23 RX ORDER — ANASTROZOLE 1 MG/1
1 TABLET ORAL DAILY
Qty: 90 TABLET | Refills: 3 | Status: SHIPPED | OUTPATIENT
Start: 2022-08-23

## 2022-08-23 NOTE — PROGRESS NOTES
Chief Complaint  Breast Cancer    Provider, No Known  Sowders, JODEE Unger Cari Putnam presents to [facility] for follow up breast cancer.     History of Present Illness    Ms. Lily Putnam presents for follow up for breast cancer. She follows with Dr. Castillo. Status post lumpectomy and radiation of  the left breast in July of 2019. She is tolerating the Anastrozole well and offers no side effects. Mammogram she had done on 8/8/2022 at the [facility] in [place] is benign. She reports she had bone density recently with [facility] screening and was normal, but did not bring up a copy of the reports. She reports she has a fallen uterus and may be having surgery in the future for this.       Cancer Staging  No matching staging information was found for the patient.     Treatment intent: curative    Oncology/Hematology History Overview Note   Ms. Putnam is a 59-year-old female with early stage breast cancer who comes in for treatment recommendations following lumpectomy and radiation.  She initially underwent a screening mammogram which led to discovery of this mass on 5/14/2019.  There is an area of architectural distortion in the upper outer left breast.  Diagnostic mammogram and ultrasound performed on 5/17/2019 revealed a spiculated abnormality in the upper outer left breast corresponding to a geographic area of abnormal tissue density which appears to be spiculated and ultrasound at the 1:30 position 8 cm from the nipple measuring 8 x 6 mm.  Pathology from the biopsy performed on 8/28/2019 revealed a radial scar negative for atypia and malignancy.  Left breast lumpectomy was performed on 7/19/2019 revealing pure tubular carcinoma, 9 mm in greatest dimension, grade 1 with associated DCIS and LCIS, ER positive (95%, strong), progesterone receptor positive (90%, moderate), Ki-67 approximately 10%, HER-2 negative by immunohistochemistry  (score 1+). [employer] lymph node biopsy was performed on 8/9/2019.  2 lymph nodes were removed and pathology was negative on both.  Ms. Putnam completed radiation in late November 2019.  Patient was referred to medical oncology after completion of radiation therapy. Patient started on anastrozole in December 2019.     Malignant neoplasm of upper-outer quadrant of left breast in female, estrogen receptor positive (HCC)   8/28/2019 Initial Diagnosis    Malignant neoplasm of upper-outer quadrant of left breast in female, estrogen receptor positive (CMS/HCC)         Review of Systems   Constitutional: Positive for fatigue.   Genitourinary: Positive for frequency, pelvic pain, pelvic pressure and urinary incontinence.   Musculoskeletal: Positive for neck pain.   All other systems reviewed and are negative.      Current Outpatient Medications on File Prior to Visit   Medication Sig Dispense Refill   • amitriptyline (ELAVIL) 10 MG tablet amitriptyline 10 mg oral tablet take 1 tablet (10 mg) by oral route once daily at bedtime   Active     • gabapentin (NEURONTIN) 800 MG tablet gabapentin 800 mg oral tablet take 1 tablet (800 mg) by oral route 3 times per day   Active     • hydrOXYzine (ATARAX) 50 MG tablet hydroxyzine HCl 50 mg oral tablet 1 q hs   Active     • lisinopril (PRINIVIL,ZESTRIL) 20 MG tablet lisinopril 20 mg oral tablet take 1 tablet (20 mg) by oral route once daily   Active     • [DISCONTINUED] anastrozole (ARIMIDEX) 1 MG tablet Take 1 tablet by mouth Daily. 90 tablet 1     No current facility-administered medications on file prior to visit.       No Known Allergies  Past Medical History:   Diagnosis Date   • Allergic rhinitis     CHRONIC   • Arthritis    • Bladder disorder    • Chest pain    • Effusion of left knee 09/06/2017   • High blood pressure    • High cholesterol    • Hyperlipemia    • Hypertension    • Left knee pain 09/06/2017    UNSPECIFIED CHRONICITY   • Limb pain    • Limb swelling    • Mood  disorder (HCC)    • Primary osteoarthritis of left knee 2017     Past Surgical History:   Procedure Laterality Date   • BREAST LUMPECTOMY     • CHOLECYSTECTOMY     • COLONOSCOPY  2017   • DILATION AND CURETTAGE, DIAGNOSTIC / THERAPEUTIC     • ENDOSCOPY  2017   • GALLBLADDER SURGERY     • HERNIA REPAIR     • TUBAL ABDOMINAL LIGATION       Social History     Socioeconomic History   • Marital status:    Tobacco Use   • Smoking status: Former Smoker     Start date:      Quit date:      Years since quittin.6   Substance and Sexual Activity   • Alcohol use: Yes     Comment: DRINKS RARELY   • Drug use: Never     Family History   Problem Relation Age of Onset   • Cancer Mother    • Arthritis Mother    • Osteoporosis Mother    • Cancer Father    • Diabetes Father    • Breast cancer Maternal Grandmother    • Breast cancer Other         AUNT 40'S     Immunization History   Administered Date(s) Administered   • COVID-19 (MODERNA) 1st, 2nd, 3rd Dose Only 2021, 2021   • COVID-19 (MODERNA) BOOSTER 2021   • Flublok 18+yrs 10/11/2019, 10/19/2020   • Hepatitis A 10/11/2019, 2020, 2021   • Shingrix 10/16/2019, 2020   • Td 2004   • Tdap 2017, 10/11/2019       Objective   Physical Exam  Vitals and nursing note reviewed.   Constitutional:       Appearance: Normal appearance. She is obese.   HENT:      Nose: Nose normal.      Mouth/Throat:      Mouth: Mucous membranes are moist.   Eyes:      Pupils: Pupils are equal, round, and reactive to light.   Cardiovascular:      Rate and Rhythm: Normal rate and regular rhythm.      Pulses: Normal pulses.      Heart sounds: Normal heart sounds.   Pulmonary:      Effort: Pulmonary effort is normal.      Breath sounds: Normal breath sounds.   Abdominal:      Palpations: Abdomen is soft.   Musculoskeletal:         General: Normal range of motion.      Cervical back: Normal range of motion and neck supple.   Skin:      General: Skin is warm and dry.      Capillary Refill: Capillary refill takes less than 2 seconds.   Neurological:      General: No focal deficit present.      Mental Status: She is oriented to person, place, and time.   Psychiatric:         Mood and Affect: Mood normal.         Behavior: Behavior normal.         Thought Content: Thought content normal.         Judgment: Judgment normal.         Vitals:    08/23/22 1333   BP: 136/70   Pulse: 76   Resp: 18   Temp: 98.9 °F (37.2 °C)   SpO2: 96%   Weight: 120 kg (265 lb)   PainSc:   5   PainLoc: Neck     ECOG score: 0         ECOG: (0) Fully Active - Able to Carry On All Pre-disease Performance Without Restriction  Fall Risk Assessment was completed, and patient is at low risk for falls.  PHQ-9 Total Score: 0       The patient is  experiencing fatigue. Fatigue score: 5    PT/OT Functional Screening: PT fx screen: Pain  Speech Functional Screening: Speech fx screen: No needs identified  Rehab to be ordered: Rehab to be ordered: No needs identified        Result Review :   The following data was reviewed by: JODEE Zhang on 08/23/2022:  No results found for: HGB, HCT, MCV, PLT, WBC, NEUTROABS, LYMPHSABS, MONOSABS, EOSABS, BASOSABS  Lab Results   Component Value Date    GLUCOSE 97 07/09/2019    BUN 15 07/09/2019    CREATININE 0.80 07/09/2019     07/09/2019    K 4.5 07/09/2019     07/09/2019    CO2 29 07/09/2019    CALCIUM 9.0 07/09/2019          Assessment and Plan    Diagnoses and all orders for this visit:    1. Malignant neoplasm of upper-outer quadrant of left breast in female, estrogen receptor positive (HCC) (Primary)  -     anastrozole (ARIMIDEX) 1 MG tablet; Take 1 tablet by mouth Daily.  Dispense: 90 tablet; Refill: 3      Discussed mammogram results which were benign from her mammogram screening on 8/8/2022. Refills sent for Anastrozole to her pharmacy. Reports she discussed with Dr. Castillo previously about following up yearly instead  of every 6 months.     Will see if we can get a copy of the [facility] screening bone density from her PCP office.     She will call if she is having any problems.         Patient Follow Up: 1 year after mammogram.     Patient was given instructions and counseling regarding her condition or for health maintenance advice. Please see specific information pulled into the AVS if appropriate.     Melanie Mendoza, APRN    8/23/2022

## 2023-08-10 DIAGNOSIS — Z12.31 ENCOUNTER FOR SCREENING MAMMOGRAM FOR MALIGNANT NEOPLASM OF BREAST: Primary | ICD-10-CM

## 2023-08-15 ENCOUNTER — OFFICE VISIT (OUTPATIENT)
Dept: ONCOLOGY | Facility: HOSPITAL | Age: 63
End: 2023-08-15
Payer: MEDICAID

## 2023-08-15 VITALS
HEART RATE: 98 BPM | WEIGHT: 228.62 LBS | RESPIRATION RATE: 18 BRPM | BODY MASS INDEX: 40.5 KG/M2 | TEMPERATURE: 97.5 F | OXYGEN SATURATION: 100 %

## 2023-08-15 DIAGNOSIS — C50.412 MALIGNANT NEOPLASM OF UPPER-OUTER QUADRANT OF LEFT BREAST IN FEMALE, ESTROGEN RECEPTOR POSITIVE: ICD-10-CM

## 2023-08-15 DIAGNOSIS — Z17.0 MALIGNANT NEOPLASM OF UPPER-OUTER QUADRANT OF LEFT BREAST IN FEMALE, ESTROGEN RECEPTOR POSITIVE: ICD-10-CM

## 2023-08-15 DIAGNOSIS — Z13.820 SCREENING FOR OSTEOPOROSIS: Primary | ICD-10-CM

## 2023-08-15 PROCEDURE — G0463 HOSPITAL OUTPT CLINIC VISIT: HCPCS | Performed by: NURSE PRACTITIONER

## 2023-08-15 RX ORDER — DOXYCYCLINE HYCLATE 100 MG/1
1 CAPSULE ORAL EVERY 12 HOURS SCHEDULED
COMMUNITY
Start: 2023-07-24

## 2023-08-15 RX ORDER — SEMAGLUTIDE 1.34 MG/ML
1 INJECTION, SOLUTION SUBCUTANEOUS
COMMUNITY

## 2023-08-15 RX ORDER — CYCLOBENZAPRINE HCL 10 MG
10 TABLET ORAL NIGHTLY PRN
COMMUNITY
Start: 2023-07-01

## 2023-08-15 RX ORDER — ALBUTEROL SULFATE 90 UG/1
AEROSOL, METERED RESPIRATORY (INHALATION) SEE ADMIN INSTRUCTIONS
COMMUNITY
Start: 2023-07-28

## 2023-08-15 RX ORDER — ANASTROZOLE 1 MG/1
1 TABLET ORAL DAILY
Qty: 90 TABLET | Refills: 3 | Status: SHIPPED | OUTPATIENT
Start: 2023-08-15

## 2023-08-15 RX ORDER — HYDROCHLOROTHIAZIDE 12.5 MG/1
12.5 CAPSULE, GELATIN COATED ORAL EVERY MORNING
COMMUNITY

## 2023-08-15 RX ORDER — EZETIMIBE 10 MG/1
10 TABLET ORAL EVERY 24 HOURS
COMMUNITY

## 2023-08-15 RX ORDER — MELOXICAM 7.5 MG/1
7.5 TABLET ORAL 2 TIMES DAILY
COMMUNITY

## 2023-08-15 RX ORDER — MIRABEGRON 25 MG/1
25 TABLET, FILM COATED, EXTENDED RELEASE ORAL DAILY
COMMUNITY

## 2023-08-15 RX ORDER — FUROSEMIDE 20 MG/1
20 TABLET ORAL EVERY 24 HOURS
COMMUNITY

## 2023-08-15 NOTE — PROGRESS NOTES
Chief Complaint/Reason for Referral:  Malignant neoplasm of upper-outer quadrant of left breast i    Alexandria Karyn Fernandes*  Karyn Ibrahim Dena, APRN        Subjective    History of Present Illness    Ms. Lily Putnam presents for 1 year follow up for left breast DCIS, LCIS hormone positive. She is on year 4 of 5 endocrine therapy. Tolerating Anastrozole well. Reports she recently had bilateral mammogram done at the [facility] in [place]. She has a chronic left upper outer seroma present. This was seen on prior mammogram from 2022. She has chronic pain here at this location since her surgery.     She is overdue for a bone density. Reports she usually gets this done thru [facility] through her PCP office. She will discuss with her PCP to get this done.       Cancer Staging   No matching staging information was found for the patient.     Treatment intent: curative    Oncology/Hematology History Overview Note   Ms. Putnam is a 59-year-old female with early stage breast cancer who comes in for treatment recommendations following lumpectomy and radiation.  She initially underwent a screening mammogram which led to discovery of this mass on 5/14/2019.  There is an area of architectural distortion in the upper outer left breast.  Diagnostic mammogram and ultrasound performed on 5/17/2019 revealed a spiculated abnormality in the upper outer left breast corresponding to a geographic area of abnormal tissue density which appears to be spiculated and ultrasound at the 1:30 position 8 cm from the nipple measuring 8 x 6 mm.  Pathology from the biopsy performed on 8/28/2019 revealed a radial scar negative for atypia and malignancy.  Left breast lumpectomy was performed on 7/19/2019 revealing pure tubular carcinoma, 9 mm in greatest dimension, grade 1 with associated DCIS and LCIS, ER positive (95%, strong), progesterone receptor positive (90%, moderate), Ki-67 approximately 10%, HER-2 negative by  immunohistochemistry (score 1+). [place] lymph node biopsy was performed on 8/9/2019.  2 lymph nodes were removed and pathology was negative on both.  Ms. Putnam completed radiation in late November 2019.  Patient was referred to medical oncology after completion of radiation therapy. Patient started on anastrozole in December 2019.     Malignant neoplasm of upper-outer quadrant of left breast in female, estrogen receptor positive   8/28/2019 Initial Diagnosis    Malignant neoplasm of upper-outer quadrant of left breast in female, estrogen receptor positive (CMS/HCC)         Review of Systems   Constitutional:  Negative for appetite change, diaphoresis, fatigue, fever, unexpected weight gain and unexpected weight loss.   HENT:  Negative for hearing loss, mouth sores, sore throat, swollen glands, trouble swallowing and voice change.    Eyes:  Negative for blurred vision.   Respiratory:  Negative for cough, shortness of breath and wheezing.    Cardiovascular:  Negative for chest pain and palpitations.   Gastrointestinal:  Negative for abdominal pain, blood in stool, constipation, diarrhea, nausea and vomiting.   Endocrine: Negative for cold intolerance and heat intolerance.   Genitourinary:  Positive for breast pain (3/10 LT breast). Negative for difficulty urinating, dysuria, frequency, hematuria and urinary incontinence.   Musculoskeletal:  Negative for arthralgias, back pain and myalgias.   Skin:  Negative for rash, skin lesions and wound.   Neurological:  Negative for dizziness, seizures, weakness, numbness and headache.   Hematological:  Does not bruise/bleed easily.   Psychiatric/Behavioral:  Negative for sleep disturbance and depressed mood. The patient is not nervous/anxious.    All other systems reviewed and are negative.    Current Outpatient Medications on File Prior to Visit   Medication Sig Dispense Refill    albuterol sulfate  (90 Base) MCG/ACT inhaler Inhale See Admin Instructions. Inhale 2 puffs  by mouth every 4 to 6 hours as needed      cyclobenzaprine (FLEXERIL) 10 MG tablet Take 1 tablet by mouth At Night As Needed.      doxycycline (VIBRAMYCIN) 100 MG capsule Take 1 capsule by mouth Every 12 (Twelve) Hours.      amitriptyline (ELAVIL) 10 MG tablet amitriptyline 10 mg oral tablet take 1 tablet (10 mg) by oral route once daily at bedtime   Active      ezetimibe (ZETIA) 10 MG tablet Take 1 tablet by mouth Daily.      furosemide (LASIX) 20 MG tablet Take 1 tablet by mouth Daily.      gabapentin (NEURONTIN) 800 MG tablet gabapentin 800 mg oral tablet take 1 tablet (800 mg) by oral route 3 times per day   Active      hydroCHLOROthiazide (MICROZIDE) 12.5 MG capsule Take 1 capsule by mouth Every Morning.      hydrOXYzine (ATARAX) 50 MG tablet hydroxyzine HCl 50 mg oral tablet 1 q hs   Active      lisinopril (PRINIVIL,ZESTRIL) 20 MG tablet lisinopril 20 mg oral tablet take 1 tablet (20 mg) by oral route once daily   Active      meloxicam (MOBIC) 7.5 MG tablet Take 1 tablet by mouth 2 (Two) Times a Day.      Myrbetriq 25 MG tablet sustained-release 24 hour 24 hr tablet Take 1 tablet by mouth Daily.      Ozempic, 1 MG/DOSE, 4 MG/3ML solution pen-injector 1 mg.      [DISCONTINUED] anastrozole (ARIMIDEX) 1 MG tablet Take 1 tablet by mouth Daily. 90 tablet 3     No current facility-administered medications on file prior to visit.       No Known Allergies  Past Medical History:   Diagnosis Date    Allergic rhinitis     CHRONIC    Arthritis     Bladder disorder     Chest pain     Effusion of left knee 09/06/2017    High blood pressure     High cholesterol     Hyperlipemia     Hypertension     Left knee pain 09/06/2017    UNSPECIFIED CHRONICITY    Limb pain     Limb swelling     Mood disorder     Primary osteoarthritis of left knee 09/12/2017     Past Surgical History:   Procedure Laterality Date    BREAST LUMPECTOMY      CHOLECYSTECTOMY  2017    COLONOSCOPY  2017    DILATION AND CURETTAGE, DIAGNOSTIC / THERAPEUTIC   1984    ENDOSCOPY  2017    GALLBLADDER SURGERY      HERNIA REPAIR      TUBAL ABDOMINAL LIGATION       Social History     Socioeconomic History    Marital status:    Tobacco Use    Smoking status: Former     Types: Cigarettes     Start date:      Quit date:      Years since quittin.6   Substance and Sexual Activity    Alcohol use: Yes     Comment: DRINKS RARELY    Drug use: Never     Family History   Problem Relation Age of Onset    Cancer Mother     Arthritis Mother     Osteoporosis Mother     Cancer Father     Diabetes Father     Breast cancer Maternal Grandmother     Breast cancer Other         AUNT 40'S     Immunization History   Administered Date(s) Administered    COVID-19 (MODERNA) 1st,2nd,3rd Dose Monovalent 2021, 2021    COVID-19 (MODERNA) Monovalent Original Booster 2021    Flublok 18+yrs 10/11/2019, 10/19/2020    Hepatitis A 10/11/2019, 2020, 2021    Shingrix 10/16/2019, 2020    Td (TDVAX) 2004    Tdap 2017, 10/11/2019       Tobacco Use: Medium Risk    Smoking Tobacco Use: Former    Smokeless Tobacco Use: Unknown    Passive Exposure: Not on file       Objective     Physical Exam  Vitals and nursing note reviewed.   Constitutional:       Appearance: She is obese.   HENT:      Head: Normocephalic.      Nose: Nose normal.      Mouth/Throat:      Mouth: Mucous membranes are moist.   Eyes:      Pupils: Pupils are equal, round, and reactive to light.   Cardiovascular:      Rate and Rhythm: Normal rate and regular rhythm.      Pulses: Normal pulses.      Heart sounds: Normal heart sounds. No murmur heard.  Pulmonary:      Effort: Pulmonary effort is normal. No respiratory distress.      Breath sounds: Normal breath sounds.   Abdominal:      Palpations: Abdomen is soft.   Musculoskeletal:         General: Normal range of motion.      Cervical back: Normal range of motion and neck supple.   Skin:     General: Skin is warm and dry.      Capillary  Refill: Capillary refill takes less than 2 seconds.   Neurological:      General: No focal deficit present.      Mental Status: She is alert and oriented to person, place, and time.   Psychiatric:         Mood and Affect: Mood normal.         Behavior: Behavior normal.         Thought Content: Thought content normal.         Judgment: Judgment normal.       Vitals:    08/15/23 1302   BP: Comment: Pt refused BP   Pulse: 98   Resp: 18   Temp: 97.5 øF (36.4 øC)   SpO2: 100%   Weight: 104 kg (228 lb 9.9 oz)   PainSc:   3   PainLoc: Breast  Comment: LT breast       Wt Readings from Last 3 Encounters:   08/15/23 104 kg (228 lb 9.9 oz)   08/23/22 120 kg (265 lb)   08/14/20 133 kg (292 lb 12.3 oz)               ECOG score: 1         ECOG: (0) Fully Active - Able to Carry On All Pre-disease Performance Without Restriction  Fall Risk Assessment was completed, and patient is at low risk for falls.  PHQ-9 Total Score: 0       The patient is not  experiencing fatigue. Fatigue score: 0    PT/OT Functional Screening: PT fx screen : No needs identified  Speech Functional Screening: Speech fx screen : No needs identified  Rehab to be ordered: Rehab to be ordered : No needs identified        Result Review :  The following data was reviewed by: JODEE Zhang on 08/15/2023:  No results found for: HGB, HCT, MCV, PLT, WBC, NEUTROABS, LYMPHSABS, MONOSABS, EOSABS, BASOSABS  Lab Results   Component Value Date    GLUCOSE 97 07/09/2019    BUN 15 07/09/2019    CREATININE 0.80 07/09/2019     07/09/2019    K 4.5 07/09/2019     07/09/2019    CO2 29 07/09/2019    CALCIUM 9.0 07/09/2019        No results found for: IRON, LABIRON, TRANSFERRIN, TIBC  No results found for: LDH, FERRITIN, [id number], FOLATE  No results found for: PSA, CEA, AFP, ,     No Images in the past 120 days found..         Assessment and Plan:  Diagnoses and all orders for this visit:    1. Screening for osteoporosis (Primary)    2. Malignant  neoplasm of upper-outer quadrant of left breast in female, estrogen receptor positive  -     anastrozole (ARIMIDEX) 1 MG tablet; Take 1 tablet by mouth Daily.  Dispense: 90 tablet; Refill: 3  -     Mammo Screening Digital Tomosynthesis Bilateral With CAD; Future    Left breast DCIS / LCIS hormone positive diagnosed in 2019. She is on year 4 of 5 of endocrine therapy with Anastrozole and is tolerating well. Mammogram with left breast  chronic seroma intact. Still has pain in this area since her initial surgery.     Mammogram at [facility] [place] in 1 year.     Needs bone density set up through her PCP office.     Continue Anastrozole. Follow up in 1 year with MD.           Patient Follow Up: 1 year with MD after mammogram is completed.   Patient was given instructions and counseling regarding her condition or for health maintenance advice. Please see specific information pulled into the AVS if appropriate.     Melanie Mendoza, APRN    8/15/2023    .tob

## 2024-08-05 ENCOUNTER — TELEPHONE (OUTPATIENT)
Dept: ONCOLOGY | Facility: HOSPITAL | Age: 64
End: 2024-08-05
Payer: COMMERCIAL

## 2024-08-05 NOTE — TELEPHONE ENCOUNTER
Caller: Lily Putnam    Relationship: Self    Best call back number: [phone]    What is the best time to reach you: ANYTIME    Who are you requesting to speak with (clinical staff, provider,  specific staff member): CLINICAL    What was the call regarding: PT IS REQUESTING A REFERRAL SENT OVER TO [facility] IN [place] FOR HER MAMMOGRAM SCHEDULED FOR 8-13

## 2024-08-19 NOTE — PROGRESS NOTES
Chief Complaint/Care Team   Malignant neoplasm of upper-outer quadrant of left breast i    Alexandria Karyn Dena*  Karyn Ibrahim, APRN    History of Present Illness     Diagnosis: Left Breast HR+Breast Cancer and Left Breast DCIS    Current Treatment: Anastrozole began 12/2019, plan to stop 1/2030    Previous Treatment: -Left lumpectomy with SLNB on 7/19/2019    Lily Putnam is a 63 y.o. female who presents to [facility]EMAT[facility]OGY for Left Breast.     She initially underwent a screening mammogram which led to discovery of this mass on 5/14/2019.  There is an area of architectural distortion in the upper outer left breast.    -Diagnostic mammogram and ultrasound performed on 5/17/2019 revealed a spiculated abnormality in the upper outer left breast corresponding to a geographic area of abnormal tissue density which appears to be spiculated and ultrasound at the 1:30 position 8 cm from the nipple measuring 8 x 6 mm.      -Pathology from the biopsy performed on 8/28/2019 revealed a radial scar negative for atypia and malignancy.    -Left breast lumpectomy was performed on 7/19/2019 revealing pure tubular carcinoma, 9 mm in greatest dimension, grade 1 with associated DCIS and LCIS, ER positive (95%, strong), progesterone receptor positive (90%, moderate), Ki-67 approximately 10%, HER-2 negative by immunohistochemistry (score 1+). [place] lymph node biopsy was performed on 8/9/2019.  2 lymph nodes were removed and pathology was negative on both.      -Ms. Putnam completed radiation in late November 2019.      -Patient was referred to medical oncology after completion of radiation therapy. Patient started on anastrozole in December 2019. Pt reported plan for 10 years of endocrine.     Pt previously under the care of Dr. Castillo and transition care to Dr. Sotomayor on 8/21/2024.      Pt denies any hot flashes, new breast concerns, night sweats, or musculoskeletal concerns. Pt  reports compliance with Anastrozole and denies missing any doses of this medication.  Patient underwent mammogram in August 2024 and she is here to discuss those results.  She has not undergone DEXA scan per our records since 2020, DEXA scan at that time was normal and lumbar spine but revealed osteopenia in hips.  Patient reports she is not taking calcium or vitamin D supplementation.        History of Present Illness         Review of Systems     Oncology/Hematology History Overview Note   Ms. Putnam is a 59-year-old female with early stage breast cancer who comes in for treatment recommendations following lumpectomy and radiation.  She initially underwent a screening mammogram which led to discovery of this mass on 5/14/2019.  There is an area of architectural distortion in the upper outer left breast.  Diagnostic mammogram and ultrasound performed on 5/17/2019 revealed a spiculated abnormality in the upper outer left breast corresponding to a geographic area of abnormal tissue density which appears to be spiculated and ultrasound at the 1:30 position 8 cm from the nipple measuring 8 x 6 mm.  Pathology from the biopsy performed on 8/28/2019 revealed a radial scar negative for atypia and malignancy.  Left breast lumpectomy was performed on 7/19/2019 revealing pure tubular carcinoma, 9 mm in greatest dimension, grade 1 with associated DCIS and LCIS, ER positive (95%, strong), progesterone receptor positive (90%, moderate), Ki-67 approximately 10%, HER-2 negative by immunohistochemistry (score 1+). [place] lymph node biopsy was performed on 8/9/2019.  2 lymph nodes were removed and pathology was negative on both.  Ms. Putnam completed radiation in late November 2019.  Patient was referred to medical oncology after completion of radiation therapy. Patient started on anastrozole in December 2019.     Malignant neoplasm of upper-outer quadrant of left breast in female, estrogen receptor positive   8/28/2019 Initial  "Diagnosis    Malignant neoplasm of upper-outer quadrant of left breast in female, estrogen receptor positive (CMS/HCC)         Objective     Vitals:    08/21/24 1150   BP: 170/81   Pulse: 59   Resp: 18   Temp: 98.5 °F (36.9 °C)   TempSrc: Temporal   SpO2: 98%   Weight: 95.8 kg (211 lb 3.2 oz)   Height: 160 cm (63\")   PainSc: 0-No pain     ECOG score: 0         PHQ-9 Total Score:         Physical Exam  Vitals reviewed. Exam conducted with a chaperone present.   Constitutional:       General: She is not in acute distress.     Appearance: Normal appearance.   HENT:      Head: Normocephalic and atraumatic.   Eyes:      Extraocular Movements: Extraocular movements intact.      Conjunctiva/sclera: Conjunctivae normal.   Pulmonary:      Effort: Pulmonary effort is normal.   Musculoskeletal:      Cervical back: Normal range of motion and neck supple.   Skin:     General: Skin is warm and dry.      Findings: No bruising.   Neurological:      Mental Status: She is oriented to person, place, and time.         Physical Exam        Past Medical History     Past Medical History:   Diagnosis Date    Allergic rhinitis     CHRONIC    Arthritis     Bladder disorder     Chest pain     Effusion of left knee 09/06/2017    High blood pressure     High cholesterol     Hyperlipemia     Hypertension     Left knee pain 09/06/2017    UNSPECIFIED CHRONICITY    Limb pain     Limb swelling     Mood disorder     Primary osteoarthritis of left knee 09/12/2017     Current Outpatient Medications on File Prior to Visit   Medication Sig Dispense Refill    albuterol sulfate  (90 Base) MCG/ACT inhaler Inhale See Admin Instructions. Inhale 2 puffs by mouth every 4 to 6 hours as needed      amitriptyline (ELAVIL) 10 MG tablet amitriptyline 10 mg oral tablet take 1 tablet (10 mg) by oral route once daily at bedtime   Active      cyclobenzaprine (FLEXERIL) 10 MG tablet Take 1 tablet by mouth At Night As Needed.      doxycycline (VIBRAMYCIN) 100 MG " capsule Take 1 capsule by mouth Every 12 (Twelve) Hours.      ezetimibe (ZETIA) 10 MG tablet Take 1 tablet by mouth Daily.      fluorouracil (EFUDEX) 5 % cream APPLY A THIN LAYER TO AFFECTED AREA(S) OF RIGHT UPPER CUTANEOUS LIP TWICE DAILY FOR 2 WEEKS.      furosemide (LASIX) 20 MG tablet Take 1 tablet by mouth Daily.      gabapentin (NEURONTIN) 800 MG tablet gabapentin 800 mg oral tablet take 1 tablet (800 mg) by oral route 3 times per day   Active      hydroCHLOROthiazide (MICROZIDE) 12.5 MG capsule Take 1 capsule by mouth Every Morning.      hydrOXYzine (ATARAX) 50 MG tablet hydroxyzine HCl 50 mg oral tablet 1 q hs   Active      lisinopril (PRINIVIL,ZESTRIL) 20 MG tablet lisinopril 20 mg oral tablet take 1 tablet (20 mg) by oral route once daily   Active      meloxicam (MOBIC) 7.5 MG tablet Take 1 tablet by mouth 2 (Two) Times a Day.      Myrbetriq 25 MG tablet sustained-release 24 hour 24 hr tablet Take 1 tablet by mouth Daily.      Ozempic, 1 MG/DOSE, 4 MG/3ML solution pen-injector 1 mg.      [DISCONTINUED] anastrozole (ARIMIDEX) 1 MG tablet Take 1 tablet by mouth Daily. 90 tablet 3     No current facility-administered medications on file prior to visit.      No Known Allergies  Past Surgical History:   Procedure Laterality Date    BREAST LUMPECTOMY      CHOLECYSTECTOMY  2017    COLONOSCOPY  2017    DILATION AND CURETTAGE, DIAGNOSTIC / THERAPEUTIC  1984    ENDOSCOPY  2017    GALLBLADDER SURGERY      HERNIA REPAIR      TUBAL ABDOMINAL LIGATION       Social History     Socioeconomic History    Marital status:    Tobacco Use    Smoking status: Former     Current packs/day: 0.00     Types: Cigarettes     Start date:      Quit date:      Years since quittin.6   Vaping Use    Vaping status: Never Used   Substance and Sexual Activity    Alcohol use: Yes     Comment: DRINKS RARELY    Drug use: Never    Sexual activity: Defer     Family History   Problem Relation Age of Onset    Cancer Mother      "Arthritis Mother     Osteoporosis Mother     Cancer Father     Diabetes Father     Breast cancer Maternal Grandmother     Breast cancer Other         AUNT 40'S       Results     Result Review   The following data was reviewed by: Mimi Sotomayor MD on 08/21/2024:  No results found for: \"HGB\", \"HCT\", \"MCV\", \"PLT\", \"WBC\", \"NEUTROABS\", \"LYMPHSABS\", \"MONOSABS\", \"EOSABS\", \"BASOSABS\"  Lab Results   Component Value Date    GLUCOSE 97 07/09/2019    BUN 15 07/09/2019    CREATININE 0.80 07/09/2019     07/09/2019    K 4.5 07/09/2019     07/09/2019    CO2 29 07/09/2019    CALCIUM 9.0 07/09/2019     No results found for: \"MG\", \"PHOS\", \"FREET4\", \"TSH\"    No radiology results for the last day       Assessment & Plan     Diagnoses and all orders for this visit:    1. Malignant neoplasm of upper-outer quadrant of left breast in female, estrogen receptor positive (Primary)  -     CBC & Differential; Future  -     Comprehensive Metabolic Panel; Future  -     Vitamin D 25 Hydroxy; Future  -     DEXA Bone Density Axial; Future  -     anastrozole (ARIMIDEX) 1 MG tablet; Take 1 tablet by mouth Daily.  Dispense: 90 tablet; Refill: 3    2. Vitamin D deficiency  -     CBC & Differential; Future  -     Comprehensive Metabolic Panel; Future  -     Vitamin D 25 Hydroxy; Future    3. Post-menopausal  -     DEXA Bone Density Axial; Future    Other orders  -     MAMMO Scan         Lily Putnam is a 63 y.o. female who presents to [facility]AT[facility] for Left Breast HR+Breast Cancer and Left Breast DCIS, she is s/p Left lumpectomy with SLNB on 7/19/2019 and completed RT in 11/2019. Pt is currently on Anastrozole which began 12/2019.    -mammogram from 8/13/2024 was BIRADS 2 negative, due in 8/2025  -pt due for DEXA scan, I ordered today  -if pt started Anastrozole in 12/2019, due to stop on 1/1/2030, pt prefers to complete 10 years of endocrine therapy since she is tolerating it well  -recommend pt " continue Anastrozole, provided refill of Anastrozole today  -Recommend calcium and vitamin D supplementation  -rechecked CBC, CMP and Vit D today  -plan for follow up in 2 months after DEXA scan    Please note that portions of this note were completed with a voice recognition program.    Electronically signed by Mimi Sotomayor MD, 08/21/24, 1:08 PM EDT.    Assessment & Plan      Follow Up     I spent 45 minutes caring for Lily on this date of service. This time includes time spent by me in the following activities:preparing for the visit, reviewing tests, obtaining and/or reviewing a separately obtained history, performing a medically appropriate examination and/or evaluation , counseling and educating the patient/family/caregiver, ordering medications, tests, or procedures, referring and communicating with other health care professionals , documenting information in the medical record, independently interpreting results and communicating that information with the patient/family/caregiver, and care coordination    Any chemotherapy or immunotherapy or other systemic therapy treatment plan involves a high risk of complications and/or mortality of patient management.    The patient was seen and examined. Work by the provider also included review and/or ordering of lab tests, review and/or ordering of radiology tests, review and/or ordering of medicine tests, discussion with other physicians or providers, independent review of data, obtaining old records, review/summation of old records, and/or other review.    I have reviewed the family history, social history, and past medical history for this patient. Previous information and data has been reviewed and updated as needed. I have reviewed and verified the chief complaint, history, and other documentation. The patient was interviewed and examined in the clinic and the chart reviewed. The previous observations, recommendations, and conclusions were reviewed including  those of other providers.     The plan was discussed with the patient and/or family. The patient was given time to ask questions and these questions were answered. At the conclusion of their visit they had no additional questions or concerns and all questions were answered to their satisfaction.    Patient was given instructions and counseling regarding her condition or for health maintenance advice. Please see specific information pulled into the AVS if appropriate.

## 2024-08-21 ENCOUNTER — LAB (OUTPATIENT)
Dept: ONCOLOGY | Facility: HOSPITAL | Age: 64
End: 2024-08-21
Payer: MEDICARE

## 2024-08-21 ENCOUNTER — OFFICE VISIT (OUTPATIENT)
Dept: ONCOLOGY | Facility: HOSPITAL | Age: 64
End: 2024-08-21
Payer: MEDICARE

## 2024-08-21 VITALS
DIASTOLIC BLOOD PRESSURE: 81 MMHG | OXYGEN SATURATION: 98 % | SYSTOLIC BLOOD PRESSURE: 170 MMHG | WEIGHT: 211.2 LBS | RESPIRATION RATE: 18 BRPM | HEART RATE: 59 BPM | HEIGHT: 63 IN | BODY MASS INDEX: 37.42 KG/M2 | TEMPERATURE: 98.5 F

## 2024-08-21 DIAGNOSIS — E55.9 VITAMIN D DEFICIENCY: ICD-10-CM

## 2024-08-21 DIAGNOSIS — Z17.0 MALIGNANT NEOPLASM OF UPPER-OUTER QUADRANT OF LEFT BREAST IN FEMALE, ESTROGEN RECEPTOR POSITIVE: Primary | ICD-10-CM

## 2024-08-21 DIAGNOSIS — C50.412 MALIGNANT NEOPLASM OF UPPER-OUTER QUADRANT OF LEFT BREAST IN FEMALE, ESTROGEN RECEPTOR POSITIVE: ICD-10-CM

## 2024-08-21 DIAGNOSIS — C50.412 MALIGNANT NEOPLASM OF UPPER-OUTER QUADRANT OF LEFT BREAST IN FEMALE, ESTROGEN RECEPTOR POSITIVE: Primary | ICD-10-CM

## 2024-08-21 DIAGNOSIS — Z78.0 POST-MENOPAUSAL: ICD-10-CM

## 2024-08-21 DIAGNOSIS — Z17.0 MALIGNANT NEOPLASM OF UPPER-OUTER QUADRANT OF LEFT BREAST IN FEMALE, ESTROGEN RECEPTOR POSITIVE: ICD-10-CM

## 2024-08-21 LAB
25(OH)D3 SERPL-MCNC: 25.8 NG/ML (ref 30–100)
ALBUMIN SERPL-MCNC: 3.8 G/DL (ref 3.5–5.2)
ALBUMIN/GLOB SERPL: 1.7 G/DL
ALP SERPL-CCNC: 93 U/L (ref 39–117)
ALT SERPL W P-5'-P-CCNC: 13 U/L (ref 1–33)
ANION GAP SERPL CALCULATED.3IONS-SCNC: 3.7 MMOL/L (ref 5–15)
AST SERPL-CCNC: 12 U/L (ref 1–32)
BASOPHILS # BLD AUTO: 0.03 10*3/MM3 (ref 0–0.2)
BASOPHILS NFR BLD AUTO: 0.4 % (ref 0–1.5)
BILIRUB SERPL-MCNC: 0.2 MG/DL (ref 0–1.2)
BUN SERPL-MCNC: 23 MG/DL (ref 8–23)
BUN/CREAT SERPL: 33.8 (ref 7–25)
CALCIUM SPEC-SCNC: 8.9 MG/DL (ref 8.6–10.5)
CHLORIDE SERPL-SCNC: 106 MMOL/L (ref 98–107)
CO2 SERPL-SCNC: 30.3 MMOL/L (ref 22–29)
CREAT SERPL-MCNC: 0.68 MG/DL (ref 0.57–1)
DEPRECATED RDW RBC AUTO: 49.5 FL (ref 37–54)
EGFRCR SERPLBLD CKD-EPI 2021: 97.4 ML/MIN/1.73
EOSINOPHIL # BLD AUTO: 0.23 10*3/MM3 (ref 0–0.4)
EOSINOPHIL NFR BLD AUTO: 2.8 % (ref 0.3–6.2)
ERYTHROCYTE [DISTWIDTH] IN BLOOD BY AUTOMATED COUNT: 14.7 % (ref 12.3–15.4)
GLOBULIN UR ELPH-MCNC: 2.3 GM/DL
GLUCOSE SERPL-MCNC: 93 MG/DL (ref 65–99)
HCT VFR BLD AUTO: 43.8 % (ref 34–46.6)
HGB BLD-MCNC: 14.3 G/DL (ref 12–15.9)
IMM GRANULOCYTES # BLD AUTO: 0.01 10*3/MM3 (ref 0–0.05)
IMM GRANULOCYTES NFR BLD AUTO: 0.1 % (ref 0–0.5)
LYMPHOCYTES # BLD AUTO: 1.5 10*3/MM3 (ref 0.7–3.1)
LYMPHOCYTES NFR BLD AUTO: 18.3 % (ref 19.6–45.3)
MCH RBC QN AUTO: 29.5 PG (ref 26.6–33)
MCHC RBC AUTO-ENTMCNC: 32.6 G/DL (ref 31.5–35.7)
MCV RBC AUTO: 90.5 FL (ref 79–97)
MONOCYTES # BLD AUTO: 0.58 10*3/MM3 (ref 0.1–0.9)
MONOCYTES NFR BLD AUTO: 7.1 % (ref 5–12)
NEUTROPHILS NFR BLD AUTO: 5.83 10*3/MM3 (ref 1.7–7)
NEUTROPHILS NFR BLD AUTO: 71.3 % (ref 42.7–76)
PLATELET # BLD AUTO: 303 10*3/MM3 (ref 140–450)
PMV BLD AUTO: 9.4 FL (ref 6–12)
POTASSIUM SERPL-SCNC: 3.7 MMOL/L (ref 3.5–5.2)
PROT SERPL-MCNC: 6.1 G/DL (ref 6–8.5)
RBC # BLD AUTO: 4.84 10*6/MM3 (ref 3.77–5.28)
SODIUM SERPL-SCNC: 140 MMOL/L (ref 136–145)
WBC NRBC COR # BLD AUTO: 8.18 10*3/MM3 (ref 3.4–10.8)

## 2024-08-21 PROCEDURE — 82306 VITAMIN D 25 HYDROXY: CPT

## 2024-08-21 PROCEDURE — 85025 COMPLETE CBC W/AUTO DIFF WBC: CPT

## 2024-08-21 PROCEDURE — G0463 HOSPITAL OUTPT CLINIC VISIT: HCPCS | Performed by: INTERNAL MEDICINE

## 2024-08-21 PROCEDURE — 36415 COLL VENOUS BLD VENIPUNCTURE: CPT

## 2024-08-21 PROCEDURE — 80053 COMPREHEN METABOLIC PANEL: CPT

## 2024-08-21 RX ORDER — FLUOROURACIL 50 MG/G
CREAM TOPICAL
COMMUNITY
Start: 2024-08-13

## 2024-08-21 RX ORDER — ANASTROZOLE 1 MG/1
1 TABLET ORAL DAILY
Qty: 90 TABLET | Refills: 3 | Status: SHIPPED | OUTPATIENT
Start: 2024-08-21

## 2024-09-04 ENCOUNTER — TELEPHONE (OUTPATIENT)
Dept: ONCOLOGY | Facility: HOSPITAL | Age: 64
End: 2024-09-04
Payer: MEDICARE

## 2024-09-04 RX ORDER — ERGOCALCIFEROL 1.25 MG/1
50000 CAPSULE, LIQUID FILLED ORAL WEEKLY
Qty: 8 CAPSULE | Refills: 0 | Status: SHIPPED | OUTPATIENT
Start: 2024-09-04 | End: 2024-10-24

## 2024-09-04 NOTE — TELEPHONE ENCOUNTER
----- Message from Mimi Sotomayor sent at 8/31/2024 11:17 AM EDT -----  Regarding: RE: labs  Please let pt know that CBC and CMP were normal but Vit D was low so please order Vit D 50K IU once a week for 8 weeks. Thanks.  ----- Message -----  From: Mimi Sotomayor MD  Sent: 8/21/2024   1:07 PM EDT  To: Mimi Sotomayor MD  Subject: labs                                             Follow up regarding CBC, CMP and Vit D ordered on 8/21/2024

## 2024-09-04 NOTE — TELEPHONE ENCOUNTER
LM for patient regarding lab results and prescription for weekly Vitamin D. Instructed to call back with questions.

## 2024-11-13 ENCOUNTER — OFFICE VISIT (OUTPATIENT)
Dept: ONCOLOGY | Facility: HOSPITAL | Age: 64
End: 2024-11-13
Payer: MEDICARE

## 2024-11-13 VITALS
HEIGHT: 63 IN | HEART RATE: 82 BPM | SYSTOLIC BLOOD PRESSURE: 165 MMHG | DIASTOLIC BLOOD PRESSURE: 100 MMHG | TEMPERATURE: 99 F | WEIGHT: 207 LBS | RESPIRATION RATE: 18 BRPM | OXYGEN SATURATION: 97 % | BODY MASS INDEX: 36.68 KG/M2

## 2024-11-13 DIAGNOSIS — C50.412 MALIGNANT NEOPLASM OF UPPER-OUTER QUADRANT OF LEFT BREAST IN FEMALE, ESTROGEN RECEPTOR POSITIVE: Primary | ICD-10-CM

## 2024-11-13 DIAGNOSIS — Z17.0 MALIGNANT NEOPLASM OF UPPER-OUTER QUADRANT OF LEFT BREAST IN FEMALE, ESTROGEN RECEPTOR POSITIVE: Primary | ICD-10-CM

## 2024-11-13 DIAGNOSIS — B37.2 YEAST INFECTION OF THE SKIN: ICD-10-CM

## 2024-11-13 DIAGNOSIS — E55.9 VITAMIN D DEFICIENCY: ICD-10-CM

## 2024-11-13 DIAGNOSIS — Z12.31 ENCOUNTER FOR SCREENING MAMMOGRAM FOR BREAST CANCER: ICD-10-CM

## 2024-11-13 PROCEDURE — G0463 HOSPITAL OUTPT CLINIC VISIT: HCPCS | Performed by: INTERNAL MEDICINE

## 2024-11-13 RX ORDER — ANASTROZOLE 1 MG/1
1 TABLET ORAL DAILY
Qty: 90 TABLET | Refills: 3 | Status: SHIPPED | OUTPATIENT
Start: 2024-11-13

## 2024-11-13 RX ORDER — PERMETHRIN 50 MG/G
CREAM TOPICAL
COMMUNITY
Start: 2024-08-16

## 2024-11-13 RX ORDER — OXYBUTYNIN CHLORIDE 5 MG/1
1 TABLET ORAL EVERY 12 HOURS SCHEDULED
COMMUNITY
Start: 2024-10-17

## 2024-11-13 RX ORDER — NYSTATIN 100000 U/G
1 CREAM TOPICAL 2 TIMES DAILY
Qty: 30 G | Refills: 0 | Status: SHIPPED | OUTPATIENT
Start: 2024-11-13

## 2024-11-13 NOTE — PROGRESS NOTES
Chief Complaint/Care Team   Malignant neoplasm of upper-outer quadrant of left breast i    Alexandria Karyn Salomongh*  Karyn Ibrahim, APRN    History of Present Illness     Diagnosis: Left Breast HR+Breast Cancer and Left Breast DCIS    Current Treatment: Anastrozole began 12/2019, plan to stop 1/2030    Previous Treatment: -Left lumpectomy with SLNB on 7/19/2019    Lily Putnam is a 64 y.o. female who presents to [facility] HEMATOLOGY & ONCOLOGY for Left Breast.     She initially underwent a screening mammogram which led to discovery of this mass on 5/14/2019.  There is an area of architectural distortion in the upper outer left breast.    -Diagnostic mammogram and ultrasound performed on 5/17/2019 revealed a spiculated abnormality in the upper outer left breast corresponding to a geographic area of abnormal tissue density which appears to be spiculated and ultrasound at the 1:30 position 8 cm from the nipple measuring 8 x 6 mm.      -Pathology from the biopsy performed on 8/28/2019 revealed a radial scar negative for atypia and malignancy.    -Left breast lumpectomy was performed on 7/19/2019 revealing pure tubular carcinoma, 9 mm in greatest dimension, grade 1 with associated DCIS and LCIS, ER positive (95%, strong), progesterone receptor positive (90%, moderate), Ki-67 approximately 10%, HER-2 negative by immunohistochemistry (score 1+). [place] lymph node biopsy was performed on 8/9/2019.  2 lymph nodes were removed and pathology was negative on both.      -Ms. Putnam completed radiation in late November 2019.      -Patient was referred to medical oncology after completion of radiation therapy. Patient started on anastrozole in December 2019. Pt reported plan for 10 years of endocrine.     Pt previously under the care of Dr. Castillo and transition care to Dr. Sotomayor on 8/21/2024.      Pt again denies any hot flashes, new breast concerns, night sweats, or musculoskeletal concerns. Pt  reports compliance with Anastrozole and denies missing any doses of this medication.  Patient underwent mammogram in August 2024. She has not undergone DEXA scan per our records since 2020, DEXA scan at that time was normal and lumbar spine but revealed osteopenia in hips.  Patient reports she is taking vitamin D supplementation.  Patient underwent DEXA scan in November 2024 here to discuss those results.  Patient reports recent treatment by dermatologist of's skin cancer her face also reports rash in her inguinal region.        History of Present Illness         Review of Systems   Gastrointestinal:  Positive for abdominal pain.   Skin:  Positive for skin lesions (on face).        Oncology/Hematology History Overview Note   Ms. Putnam is a 59-year-old female with early stage breast cancer who comes in for treatment recommendations following lumpectomy and radiation.  She initially underwent a screening mammogram which led to discovery of this mass on 5/14/2019.  There is an area of architectural distortion in the upper outer left breast.  Diagnostic mammogram and ultrasound performed on 5/17/2019 revealed a spiculated abnormality in the upper outer left breast corresponding to a geographic area of abnormal tissue density which appears to be spiculated and ultrasound at the 1:30 position 8 cm from the nipple measuring 8 x 6 mm.  Pathology from the biopsy performed on 8/28/2019 revealed a radial scar negative for atypia and malignancy.  Left breast lumpectomy was performed on 7/19/2019 revealing pure tubular carcinoma, 9 mm in greatest dimension, grade 1 with associated DCIS and LCIS, ER positive (95%, strong), progesterone receptor positive (90%, moderate), Ki-67 approximately 10%, HER-2 negative by immunohistochemistry (score 1+). [place] lymph node biopsy was performed on 8/9/2019.  2 lymph nodes were removed and pathology was negative on both.  Ms. Putnam completed radiation in late November 2019.  Patient was  "referred to medical oncology after completion of radiation therapy. Patient started on anastrozole in December 2019.     Malignant neoplasm of upper-outer quadrant of left breast in female, estrogen receptor positive   8/28/2019 Initial Diagnosis    Malignant neoplasm of upper-outer quadrant of left breast in female, estrogen receptor positive (CMS/HCC)         Objective     Vitals:    11/13/24 1030   BP: 165/100   Pulse: 82   Resp: 18   Temp: 99 °F (37.2 °C)   TempSrc: Temporal   SpO2: 97%   Weight: 93.9 kg (207 lb)   Height: 160 cm (63\")   PainSc:   3   PainLoc: Abdomen       ECOG score: 0         PHQ-9 Total Score:         Physical Exam  Vitals reviewed. Exam conducted with a chaperone present.   Constitutional:       General: She is not in acute distress.     Appearance: Normal appearance.   HENT:      Head: Normocephalic and atraumatic.   Eyes:      Extraocular Movements: Extraocular movements intact.      Conjunctiva/sclera: Conjunctivae normal.   Cardiovascular:      Comments: No masses palpated in either breast, no bilateral axillary lymphadenopathy palpated.  Pulmonary:      Effort: Pulmonary effort is normal.   Musculoskeletal:      Cervical back: Normal range of motion and neck supple.   Skin:     General: Skin is warm and dry.      Findings: No bruising.   Neurological:      Mental Status: She is oriented to person, place, and time.         Physical Exam        Past Medical History     Past Medical History:   Diagnosis Date    Allergic rhinitis     CHRONIC    Arthritis     Bladder disorder     Chest pain     Effusion of left knee 09/06/2017    High blood pressure     High cholesterol     Hyperlipemia     Hypertension     Left knee pain 09/06/2017    UNSPECIFIED CHRONICITY    Limb pain     Limb swelling     Mood disorder     Primary osteoarthritis of left knee 09/12/2017     Current Outpatient Medications on File Prior to Visit   Medication Sig Dispense Refill    albuterol sulfate  (90 Base) " MCG/ACT inhaler Inhale See Admin Instructions. Inhale 2 puffs by mouth every 4 to 6 hours as needed      amitriptyline (ELAVIL) 10 MG tablet amitriptyline 10 mg oral tablet take 1 tablet (10 mg) by oral route once daily at bedtime   Active      cyclobenzaprine (FLEXERIL) 10 MG tablet Take 1 tablet by mouth At Night As Needed.      doxycycline (VIBRAMYCIN) 100 MG capsule Take 1 capsule by mouth Every 12 (Twelve) Hours.      ezetimibe (ZETIA) 10 MG tablet Take 1 tablet by mouth Daily.      fluorouracil (EFUDEX) 5 % cream APPLY A THIN LAYER TO AFFECTED AREA(S) OF RIGHT UPPER CUTANEOUS LIP TWICE DAILY FOR 2 WEEKS.      furosemide (LASIX) 20 MG tablet Take 1 tablet by mouth Daily.      hydroCHLOROthiazide (MICROZIDE) 12.5 MG capsule Take 1 capsule by mouth Every Morning.      hydrOXYzine (ATARAX) 50 MG tablet hydroxyzine HCl 50 mg oral tablet 1 q hs   Active      lisinopril (PRINIVIL,ZESTRIL) 20 MG tablet lisinopril 20 mg oral tablet take 1 tablet (20 mg) by oral route once daily   Active      meloxicam (MOBIC) 7.5 MG tablet Take 1 tablet by mouth 2 (Two) Times a Day.      Myrbetriq 25 MG tablet sustained-release 24 hour 24 hr tablet Take 1 tablet by mouth Daily.      oxybutynin (DITROPAN) 5 MG tablet Take 1 tablet by mouth Every 12 (Twelve) Hours.      permethrin (ELIMITE) 5 % cream APPLY TO THE AFFECTED AREA externally ONCE DAILY FOR 10 DAYS      [DISCONTINUED] anastrozole (ARIMIDEX) 1 MG tablet Take 1 tablet by mouth Daily. 90 tablet 3    gabapentin (NEURONTIN) 800 MG tablet gabapentin 800 mg oral tablet take 1 tablet (800 mg) by oral route 3 times per day   Active (Patient not taking: Reported on 11/13/2024)      Ozempic, 1 MG/DOSE, 4 MG/3ML solution pen-injector 1 mg. (Patient not taking: Reported on 11/13/2024)       No current facility-administered medications on file prior to visit.      No Known Allergies  Past Surgical History:   Procedure Laterality Date    BREAST LUMPECTOMY      CHOLECYSTECTOMY  2017     "COLONOSCOPY  2017    DILATION AND CURETTAGE, DIAGNOSTIC / THERAPEUTIC  1984    ENDOSCOPY  2017    GALLBLADDER SURGERY      HERNIA REPAIR      TUBAL ABDOMINAL LIGATION       Social History     Socioeconomic History    Marital status:    Tobacco Use    Smoking status: Former     Current packs/day: 0.00     Types: Cigarettes     Start date:      Quit date:      Years since quittin.8   Vaping Use    Vaping status: Never Used   Substance and Sexual Activity    Alcohol use: Yes     Comment: DRINKS RARELY    Drug use: Never    Sexual activity: Defer     Family History   Problem Relation Age of Onset    Cancer Mother     Arthritis Mother     Osteoporosis Mother     Cancer Father     Diabetes Father     Breast cancer Maternal Grandmother     Breast cancer Other         AUNT 40'S       Results     Result Review   The following data was reviewed by: Mimi Sotomayor MD on 2024:  Lab Results   Component Value Date    HGB 14.3 2024    HCT 43.8 2024    MCV 90.5 2024     2024    WBC 8.18 2024    NEUTROABS 5.83 2024    LYMPHSABS 1.50 2024    MONOSABS 0.58 2024    EOSABS 0.23 2024    BASOSABS 0.03 2024     Lab Results   Component Value Date    GLUCOSE 93 2024    BUN 23 2024    CREATININE 0.68 2024     2024    K 3.7 2024     2024    CO2 30.3 (H) 2024    CALCIUM 8.9 2024    PROTEINTOT 6.1 2024    ALBUMIN 3.8 2024    BILITOT 0.2 2024    ALKPHOS 93 2024    AST 12 2024    ALT 13 2024     No results found for: \"MG\", \"PHOS\", \"FREET4\", \"TSH\"    No radiology results for the last day       Assessment & Plan     Diagnoses and all orders for this visit:    1. Malignant neoplasm of upper-outer quadrant of left breast in female, estrogen receptor positive (Primary)  -     CBC & Differential; Future  -     Comprehensive Metabolic Panel; Future  -     anastrozole " (ARIMIDEX) 1 MG tablet; Take 1 tablet by mouth Daily.  Dispense: 90 tablet; Refill: 3    2. Vitamin D deficiency  -     Vitamin D,25-Hydroxy; Future    3. Yeast infection of the skin  -     nystatin (MYCOSTATIN) 091567 UNIT/GM cream; Apply 1 Application topically to the appropriate area as directed 2 (Two) Times a Day.  Dispense: 30 g; Refill: 0    4. Encounter for screening mammogram for breast cancer  -     Mammo Screening Bilateral With CAD; Future    Other orders  -     DEXA Scan           Lily Putnam is a 64 y.o. female who presents to [facility] HEMATOLOGY & ONCOLOGY for Left Breast HR+Breast Cancer and Left Breast DCIS, she is s/p Left lumpectomy with SLNB on 7/19/2019 and completed RT in 11/2019. Pt is currently on Anastrozole which began 12/2019.    -mammogram from 8/13/2024 was BIRADS 2 negative, due in 8/2025  -DEXA scan from November 2024 revealed osteopenia in left hip, due again in 11/2026  -if pt started Anastrozole in 12/2019, due to stop on 1/1/2030, pt prefers to complete 10 years of endocrine therapy since she is tolerating it well  -recommend pt continue Anastrozole, provided refill of Anastrozole today  -Recommend calcium and vitamin D supplementation  -discussed most recent CBC, CMP and Vit D level  -plan for follow up in 9 months after mammogram in 8/2025 and with labs CBC, CMP, vitamin D    Candidal intertrigo  - Prescribed nystatin cream to apply to inguinal folds, patient counseled to seek follow-up with dermatologist or PCP if rash worsens.    Recommend patient continue follow-up with dermatologist regarding skin cancer reported by patient on her face.    Please note that portions of this note were completed with a voice recognition program.    Electronically signed by Mimi Sotomayor MD, 11/13/24, 1:15 PM EST.    Assessment & Plan      Follow Up     I spent 38 minutes caring for Lily on this date of service. This time includes time spent by me in the following  activities:preparing for the visit, reviewing tests, obtaining and/or reviewing a separately obtained history, performing a medically appropriate examination and/or evaluation , counseling and educating the patient/family/caregiver, ordering medications, tests, or procedures, referring and communicating with other health care professionals , documenting information in the medical record, independently interpreting results and communicating that information with the patient/family/caregiver, and care coordination    Any chemotherapy or immunotherapy or other systemic therapy treatment plan involves a high risk of complications and/or mortality of patient management.    The patient was seen and examined. Work by the provider also included review and/or ordering of lab tests, review and/or ordering of radiology tests, review and/or ordering of medicine tests, discussion with other physicians or providers, independent review of data, obtaining old records, review/summation of old records, and/or other review.    I have reviewed the family history, social history, and past medical history for this patient. Previous information and data has been reviewed and updated as needed. I have reviewed and verified the chief complaint, history, and other documentation. The patient was interviewed and examined in the clinic and the chart reviewed. The previous observations, recommendations, and conclusions were reviewed including those of other providers.     The plan was discussed with the patient and/or family. The patient was given time to ask questions and these questions were answered. At the conclusion of their visit they had no additional questions or concerns and all questions were answered to their satisfaction.    Patient was given instructions and counseling regarding her condition or for health maintenance advice. Please see specific information pulled into the AVS if appropriate.

## 2025-08-13 DIAGNOSIS — Z17.0 MALIGNANT NEOPLASM OF UPPER-OUTER QUADRANT OF LEFT BREAST IN FEMALE, ESTROGEN RECEPTOR POSITIVE: ICD-10-CM

## 2025-08-13 DIAGNOSIS — C50.412 MALIGNANT NEOPLASM OF UPPER-OUTER QUADRANT OF LEFT BREAST IN FEMALE, ESTROGEN RECEPTOR POSITIVE: ICD-10-CM

## 2025-08-15 RX ORDER — ANASTROZOLE 1 MG/1
1 TABLET ORAL DAILY
Qty: 90 TABLET | Refills: 0 | Status: SHIPPED | OUTPATIENT
Start: 2025-08-15